# Patient Record
Sex: FEMALE | Race: WHITE | Employment: OTHER | URBAN - METROPOLITAN AREA
[De-identification: names, ages, dates, MRNs, and addresses within clinical notes are randomized per-mention and may not be internally consistent; named-entity substitution may affect disease eponyms.]

---

## 2017-01-04 ENCOUNTER — OFFICE VISIT (OUTPATIENT)
Dept: VASCULAR SURGERY | Age: 77
End: 2017-01-04

## 2017-01-04 ENCOUNTER — HOSPITAL ENCOUNTER (OUTPATIENT)
Dept: LAB | Age: 77
Discharge: HOME | End: 2017-01-04
Payer: MEDICARE

## 2017-01-04 VITALS
RESPIRATION RATE: 20 BRPM | HEART RATE: 72 BPM | WEIGHT: 128 LBS | BODY MASS INDEX: 24.17 KG/M2 | SYSTOLIC BLOOD PRESSURE: 122 MMHG | HEIGHT: 61 IN | DIASTOLIC BLOOD PRESSURE: 60 MMHG

## 2017-01-04 DIAGNOSIS — I73.9 PERIPHERAL ARTERIAL DISEASE (HCC): ICD-10-CM

## 2017-01-04 DIAGNOSIS — I77.811 AORTIC ECTASIA, ABDOMINAL (HCC): Primary | ICD-10-CM

## 2017-01-04 LAB
ALBUMIN SERPL BCP-MCNC: 3.3 G/DL (ref 3.4–5)
ALBUMIN/GLOB SERPL: 1 {RATIO} (ref 0.8–1.7)
ALP SERPL-CCNC: 78 U/L (ref 45–117)
ALT SERPL-CCNC: 18 U/L (ref 13–56)
ANION GAP BLD CALC-SCNC: 9 MMOL/L (ref 3–18)
AST SERPL W P-5'-P-CCNC: 15 U/L (ref 15–37)
BILIRUB SERPL-MCNC: 0.2 MG/DL (ref 0.2–1)
BUN SERPL-MCNC: 10 MG/DL (ref 7–18)
BUN/CREAT SERPL: 10 (ref 12–20)
CALCIUM SERPL-MCNC: 8.7 MG/DL (ref 8.5–10.1)
CHLORIDE SERPL-SCNC: 109 MMOL/L (ref 100–108)
CO2 SERPL-SCNC: 26 MMOL/L (ref 21–32)
CREAT SERPL-MCNC: 1.02 MG/DL (ref 0.6–1.3)
GLOBULIN SER CALC-MCNC: 3.3 G/DL (ref 2–4)
GLUCOSE SERPL-MCNC: 102 MG/DL (ref 74–99)
POTASSIUM SERPL-SCNC: 3.8 MMOL/L (ref 3.5–5.5)
PROT SERPL-MCNC: 6.6 G/DL (ref 6.4–8.2)
SODIUM SERPL-SCNC: 144 MMOL/L (ref 136–145)

## 2017-01-04 PROCEDURE — 80053 COMPREHEN METABOLIC PANEL: CPT | Performed by: SURGERY

## 2017-01-04 PROCEDURE — 36415 COLL VENOUS BLD VENIPUNCTURE: CPT | Performed by: SURGERY
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
## 2017-01-11 ENCOUNTER — HOSPITAL ENCOUNTER (OUTPATIENT)
Dept: CT IMAGING | Age: 77
Discharge: HOME | End: 2017-01-11
Attending: SURGERY
Payer: MEDICARE

## 2017-01-11 DIAGNOSIS — I73.9 PERIPHERAL ARTERIAL DISEASE (HCC): ICD-10-CM

## 2017-01-11 DIAGNOSIS — I77.811 AORTIC ECTASIA, ABDOMINAL (HCC): ICD-10-CM

## 2017-01-11 PROCEDURE — 74011636320 HC RX REV CODE- 636/320: Performed by: SURGERY

## 2017-01-11 PROCEDURE — 75635 CT ANGIO ABDOMINAL ARTERIES: CPT

## 2017-01-11 RX ADMIN — IOPAMIDOL 125 ML: 755 INJECTION, SOLUTION INTRAVENOUS at 09:51

## 2017-01-18 ENCOUNTER — OFFICE VISIT (OUTPATIENT)
Dept: VASCULAR SURGERY | Age: 77
End: 2017-01-18

## 2017-01-18 VITALS
HEART RATE: 74 BPM | RESPIRATION RATE: 20 BRPM | WEIGHT: 128 LBS | BODY MASS INDEX: 24.17 KG/M2 | HEIGHT: 61 IN | DIASTOLIC BLOOD PRESSURE: 80 MMHG | SYSTOLIC BLOOD PRESSURE: 120 MMHG

## 2017-01-18 DIAGNOSIS — M79.604 PAIN OF RIGHT LOWER EXTREMITY: ICD-10-CM

## 2017-01-18 DIAGNOSIS — I77.811 AORTIC ECTASIA, ABDOMINAL (HCC): Primary | ICD-10-CM

## 2017-01-18 DIAGNOSIS — I73.9 PERIPHERAL ARTERIAL DISEASE (HCC): ICD-10-CM
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
## 2017-02-21 ENCOUNTER — OFFICE VISIT (OUTPATIENT)
Dept: VASCULAR SURGERY | Age: 77
End: 2017-02-21

## 2017-02-21 VITALS
HEART RATE: 76 BPM | BODY MASS INDEX: 24.17 KG/M2 | HEIGHT: 61 IN | SYSTOLIC BLOOD PRESSURE: 122 MMHG | DIASTOLIC BLOOD PRESSURE: 68 MMHG | WEIGHT: 128 LBS | RESPIRATION RATE: 20 BRPM

## 2017-02-21 DIAGNOSIS — I77.811 AORTIC ECTASIA, ABDOMINAL (HCC): Primary | ICD-10-CM

## 2017-02-21 DIAGNOSIS — M79.604 PAIN OF RIGHT LOWER EXTREMITY: ICD-10-CM

## 2017-02-21 DIAGNOSIS — I73.9 PERIPHERAL ARTERIAL DISEASE (HCC): ICD-10-CM
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
## 2017-02-28 ENCOUNTER — HOSPITAL ENCOUNTER (OUTPATIENT)
Dept: VASCULAR SURGERY | Age: 77
Discharge: HOME | End: 2017-02-28
Attending: SURGERY
Payer: MEDICARE

## 2017-02-28 DIAGNOSIS — I73.9 PERIPHERAL ARTERIAL DISEASE (HCC): ICD-10-CM

## 2017-02-28 PROCEDURE — 93923 UPR/LXTR ART STDY 3+ LVLS: CPT

## 2017-02-28 PROCEDURE — 93926 LOWER EXTREMITY STUDY: CPT

## 2017-02-28 NOTE — PROCEDURES
[facility]  *** FINAL REPORT ***    Name: Karen Friday  MRN: [id number]    Outpatient  : 29 Oct 1940  HIS Order #: [id number]  [address] [facility] Visit #: [id number]  Date: 2017    TYPE OF TEST: Peripheral Arterial Testing    REASON FOR TEST  Claudication, Surveillance    Right Leg  Doppler:    Normal  Ankle/Brachial: 0.85    Left Leg  Doppler:    Abnormal  Ankle/Brachial: 0.57    INTERPRETATION/FINDINGS  Physiologic testing was performed using continuous wave Doppler and  segmental pressures. 1. Mild peripheral arterial disease indicated at rest in the right  leg. The right ankle brachial index is 0.85 and the toe/brachial index   is 0.46.    2. Moderate peripheral arterial disease indicated at rest in the left  leg. The left ankle. brachial index is 0.57 and th etoe/brachial index  is 0.46    ADDITIONAL COMMENTS  There has been a significant drop in the ankle/brachial index in the  right leg since the last exam, from 1.01 to 0.85. I have personally reviewed the data relevant to the interpretation of  this  study. TECHNOLOGIST: Heber Guerrero  Signed: 2017 11:40 AM    PHYSICIAN: Tutu Nolen.  Sadie Che MD  Signed: 2017 03:43 PM

## 2017-03-01 ENCOUNTER — HOSPITAL ENCOUNTER (OUTPATIENT)
Dept: PHYSICAL THERAPY | Age: 77
Discharge: HOME | End: 2017-03-01
Payer: MEDICARE

## 2017-03-01 PROCEDURE — G8979 MOBILITY GOAL STATUS: HCPCS

## 2017-03-01 PROCEDURE — 97162 PT EVAL MOD COMPLEX 30 MIN: CPT

## 2017-03-01 PROCEDURE — 97110 THERAPEUTIC EXERCISES: CPT

## 2017-03-01 PROCEDURE — G8978 MOBILITY CURRENT STATUS: HCPCS
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
## 2017-03-01 NOTE — PROGRESS NOTES
PT DAILY TREATMENT NOTE/HIP EVAL3-16    Patient Name: Nithya Mcclure  Date:3/1/2017  : 1940  [x]  Patient  Verified  Payor: Guzman Signs / Plan: [place] PART [place] & B / Product Type: Medicare /    In time: 11:03  Out time:11:55  Total Treatment Time (min): 52  Total Timed Codes (min): 15  1:1 Treatment Time ( [address] only): 52   Visit #: 1 of 8    Treatment Area: Chronic groin pain, right [R10.31, G89.29]    SUBJECTIVE  Pain Level (0-10 scale):  6/10- Back. No groin pain right now. []constant []intermittent []improving []worsening []no change since onset    Any medication changes, allergies to medications, adverse drug reactions, diagnosis change, or new procedure performed?: [x] No    [] Yes (see summary sheet for update)  Subjective functional status/changes:     PLOF:  Patient was able to walk without any discomfort in groin   Limitations to PLOF: pain   Mechanism of Injury: Patient presents with (R) groin pain that began since patient had a (B) leg bypass in 2016. Patient had tests yesterday to rule out any obstruction in the legs. No post-op complications. Patient stated that groin pain is random, but doesn't necessarily happen every day. Current symptoms/Complaints: Patient stated that occasionally when she walking she will get a sharp pain in her (R) groin that casues her knee to give out. Patient has not had any falls from the groin pain, and has been able to catch herself. The falls in Sept and dec 2016 that patient put in paper work was from a UTI. No sustaining injuring, just had bruised (L) leg. No pain at rest. No Numbness/tingling  Previous Treatment/Compliance: Prior PT a long time ago and isn't sure what she came to PT for. No other treatment for groin. No X-rays No MRI. PMHx/Surgical Hx: No previous back/hip/knee/ankle foot surg. No pacemaker No cardiac arrythmias. Heart stent: . (B) Leg Bypass in  and . Multiple TIAs- most recent 2 years ago.  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 COPD- sleeps with O2 at night. Work Hx: not currently working  Living Situation: 2 story house, but lives downstairs. No problems with stairs. Pt Goals: see in chart  Barriers: []pain []financial []time []transportation []other None  Motivation: moderately   Substance use: []Alcohol []Tobacco []other: none  FABQ Score: []low []elevate  Cognition: A & O x  3   Other:    OBJECTIVE/EXAMINATION  Domestic Life: lives with son   Activity/Recreational Limitations: pain   Mobility: limited during onset of pain   Self Care: Ind     37 min [x]Eval                  []Re-Eval       15 min Therapeutic Exercise:  [x] See flow sheet : HEP   Rationale: increase ROM and increase strength to improve the patients ability to perform ADLs          With   [] TE   [] TA   [] neuro   [] other: Patient Education: [x] Review HEP    [] Progressed/Changed HEP based on:   [] positioning   [] body mechanics   [] transfers   [] heat/ice application    [] other:      Other Objective/Functional Measures: See Below    Physical Therapy Evaluation- Hip    Posture:    Gait:  [x] Normal    [] Abnormal    [] Antalgic    [] NWB    Device: no AD    Describe:  NO onset of hip pain or discomfort.           ROM/Strength        AROM                     PROM        Strength (1-5)  Hip Left Right Left Right Left Right   Flexion     3+ 3+   Extension     3+ 3+   Abduction     4- 3+   Adduction         ER 55 40       IR 30 40       Knee Left Right Left Right Left Right   Extension     4- 4-   Flexion     4 4        Flexibility: [] Unable to assess at this time  Hamstrings:    (L) Tightness= [] WNL   [x] Min   [] Mod   [] Severe    (R) Tightness= [] WNL   [x] Min   [] Mod   [] Severe  Quadriceps:    (L) Tightness= [] WNL   [x] Min   [] Mod   [] Severe    (R) Tightness= [] WNL   [x] Min   [] Mod   [] Severe  Gastroc: No gastroc or Leg pain reported with palpation    (L) Tightness= [] WNL   [] Min   [] Mod   [] Severe    (R) Tightness= [] WNL [] Min   [] Mod   [] Severe                                  Palpation  [] Min  [] Mod  [] Severe    Location: No TTP of hip flexor/quad   [] Min  [] Mod  [] Severe    Location:  [] Min  [] Mod  [] Severe    Location:    Optional Tests  Other tests/ comments:   No onset of pain in (R) groin with any position, exercise, or activity done during the evaluation. Pain Level (0-10 scale) post treatment: 0/10     ASSESSMENT/Changes in Function: See POC. Patient reported no onset of (R) groin pain throughout evaluation and had no pain at end of session. Advised patient to perform HEP gently and to stop if pain increases. Patient will continue to benefit from skilled PT services to modify and progress therapeutic interventions, address functional mobility deficits, address ROM deficits, address strength deficits, analyze and address soft tissue restrictions, analyze and cue movement patterns, assess and modify postural abnormalities and address imbalance/dizziness to attain remaining goals.      [x]  See Plan of Care  []  See progress note/recertification  []  See Discharge Summary         Progress towards goals / Updated goals:  See POC    PLAN  []  Upgrade activities as tolerated     [x]  Continue plan of care  []  Update interventions per flow sheet       []  Discharge due to:_  []  Other:_      Sayda Mina, PT 3/1/2017  11:09 AM

## 2017-03-03 ENCOUNTER — HOSPITAL ENCOUNTER (OUTPATIENT)
Dept: PHYSICAL THERAPY | Age: 77
Discharge: HOME | End: 2017-03-03
Payer: MEDICARE

## 2017-03-03 PROCEDURE — 97110 THERAPEUTIC EXERCISES: CPT

## 2017-03-03 NOTE — PROGRESS NOTES
PT DAILY TREATMENT NOTE - [facility] 3-16    Patient Name: Ton Williamson  Date:3/3/2017  : 1940  [x]  Patient  Verified  Payor: Roxann Mendez / Plan: [place] MED[place] & B / Product Type: Medicare /    In time: 11:27  Out time: 12:03  Total Treatment Time (min): 36  Total Timed Codes (min): 36  1:1 Treatment Time ([facility] only): 36   Visit #: 2 of 8    Treatment Area: Chronic groin pain, right [R10.31, G89.29]    SUBJECTIVE  Pain Level (0-10 scale): 0/10  Any medication changes, allergies to medications, adverse drug reactions, diagnosis change, or new procedure performed?: [x] No    [] Yes (see summary sheet for update)  Subjective functional status/changes:   [] No changes reported  Patient reported no pain today and hasn't had any sharp groin pain since the eval. Patient stated she did the HEP 1x and didn't have any questions or concerns. OBJECTIVE    36 min Therapeutic Exercise:  [x] See flow sheet :   Rationale: increase ROM and increase strength to improve the patients ability to perform ADLs       With   [] TE   [] TA   [] neuro   [] other: Patient Education: [x] Review HEP    [] Progressed/Changed HEP based on:   [] positioning   [] body mechanics   [] transfers   [] heat/ice application    [] other:      Other Objective/Functional Measures:   Initiated therapeutic exercises per flow sheet. Modified standing hip flexor stretch to supine due to patient report that she didn't feel any stretch while standing. Negative ANTONY test   Pain Level (0-10 scale) post treatment: 0/10     ASSESSMENT/Changes in Function: Patient reported no onset of (R) groin pain and no increase in low back pain during the exercises. Patient demonstrated decreased (R) glut med strength/endurance with side lying therapeutic exercises.      Patient will continue to benefit from skilled PT services to modify and progress therapeutic interventions, address functional mobility deficits, address ROM deficits, address strength deficits, analyze and address soft tissue restrictions, analyze and cue movement patterns, assess and modify postural abnormalities and address imbalance/dizziness to attain remaining goals. []  See Plan of Care  []  See progress note/recertification  []  See Discharge Summary         Progress towards goals / Updated goals:  Short Term Goals: To be accomplished in 1-2 weeks:  1. Patient will be ind and compliant with HEP 1-2x/day to increase ease with ADLs. Status at Eval: HEP established  Long Term Goals: To be accomplished in 3-4 weeks:  1. Patient will improve FOTO by at least 5 points to assist with return to PLOF. Status at Eval: 57  2. Patient will improve (R) hip strength to 4-/5 to increase ease with transfers  Status at Eval: Hip (R) flex: 3+/5 Abd: 3+/5, Ext: 3+/5  3. Patient will report not having any onset of sharp (R) groin pain for 1 week to increase safety with household management.    Status at Eval: Patient reports random onset of sharp (R) groin pain    PLAN  []  Upgrade activities as tolerated     [x]  Continue plan of care  []  Update interventions per flow sheet       []  Discharge due to:_  []  Other:_      Nichole Cornelius, PT 3/3/2017  11:35 AM

## 2017-03-06 ENCOUNTER — HOSPITAL ENCOUNTER (OUTPATIENT)
Dept: PHYSICAL THERAPY | Age: 77
Discharge: HOME | End: 2017-03-06
Payer: MEDICARE

## 2017-03-06 PROCEDURE — 97110 THERAPEUTIC EXERCISES: CPT

## 2017-03-06 NOTE — PROGRESS NOTES
PT DAILY TREATMENT NOTE - [facility]     Patient Name: Cesia Kim  Date:3/6/2017  : 1940  [x]  Patient  Verified  Payor: Hyacinth Figueroa / Plan: [place] MEDICARE PART A & B / Product Type: Medicare /    In time:1030  Out time:1115  Total Treatment Time (min): 45  Total Timed Codes (min): 45  1:1 Treatment Time ([address] only): 45   Visit #: 3 of 8    Treatment Area: Chronic groin pain, right [R10.31, G89.29]    SUBJECTIVE  Pain Level (0-10 scale): 0  Any medication changes, allergies to medications, adverse drug reactions, diagnosis change, or new procedure performed?: [x] No    [] Yes (see summary sheet for update)  Subjective functional status/changes:   [] No changes reported  Intermittent pain through out the day. OBJECTIVE        45 min Therapeutic Exercise:  [] See flow sheet :   Rationale: increase ROM, increase strength and improve coordination          With   [] TE   [] TA   [] neuro   [] other: Patient Education: [x] Review HEP    [] Progressed/Changed HEP based on:   [] positioning   [] body mechanics   [] transfers   [] heat/ice application    [] other:      Other Objective/Functional Measures:   none     Pain Level (0-10 scale) post treatment: 0    ASSESSMENT/Changes in Function:   Good tolerance to therapy with no adverse effects. No onset of groin pain with TE    Patient will continue to benefit from skilled PT services to modify and progress therapeutic interventions, address functional mobility deficits, address ROM deficits and address strength deficits to attain remaining goals. [x]  See Plan of Care  []  See progress note/recertification  []  See Discharge Summary         Progress towards goals / Updated goals:  Short Term Goals: To be accomplished in 1-2 weeks:  1. Patient will be ind and compliant with HEP 1-2x/day to increase ease with ADLs. Status at Eval: HEP established  Long Term Goals: To be accomplished in 3-4 weeks:  1.  Patient will improve FOTO by at least 5 points to assist with return to [facility]. Status at Eval: 57  2. Patient will improve (R) hip strength to 4-/5 to increase ease with transfers  Status at Eval: Hip (R) flex: 3+/5 Abd: 3+/5, Ext: 3+/5  3. Patient will report not having any onset of sharp (R) groin pain for 1 week to increase safety with household management.    Status at Eval: Patient reports random onset of sharp (R) groin pain    PLAN  [x]  Upgrade activities as tolerated     [x]  Continue plan of care  []  Update interventions per flow sheet       []  Discharge due to:_  []  Other:_      Santosh Watson PTA 3/6/2017  10:50 AM    Future Appointments  Date Time Provider Ho Sanchez   3/9/2017 11:00 AM GABINO Alvarado [facility]   3/14/2017 10:30 AM ANA MARÍA Stevenson THE [facility]   3/16/2017 10:30 AM ANA MARÍA Stevenson THE [facility]   3/21/2017 10:15 AM Mo Lerner MD [id number] [facility]

## 2017-03-09 ENCOUNTER — HOSPITAL ENCOUNTER (OUTPATIENT)
Dept: PHYSICAL THERAPY | Age: 77
Discharge: HOME | End: 2017-03-09
Payer: MEDICARE

## 2017-03-09 PROCEDURE — 97110 THERAPEUTIC EXERCISES: CPT

## 2017-03-09 NOTE — PROGRESS NOTES
PT DAILY TREATMENT NOTE - [facility]     Patient Name: Izzy Poole  Date:3/9/2017  : 1940  [x]  Patient  Verified  Payor: Karen Pina / Plan: [place] MEDICARE PART A & B / Product Type: Medicare /    In time:11  Out time:12  Total Treatment Time (min): 60  Total Timed Codes (min): 60  1:1 Treatment Time ( [address] only): 40   Visit #: 4 of 8    Treatment Area: Chronic groin pain, right [R10.31, G89.29]    SUBJECTIVE  Pain Level (0-10 scale): 0  Any medication changes, allergies to medications, adverse drug reactions, diagnosis change, or new procedure performed?: [x] No    [] Yes (see summary sheet for update)  Subjective functional status/changes:   [] No changes reported  No onset of groin pan since last session.  Min soreness     OBJECTIVE    Modality rationale: increase tissue extensibility   Min Type Additional Details    [] Estim:  []Unatt       []IFC  []Premod                        []Other:  []w/ice   []w/heat  Position:  Location:    [] Estim: []Att    []TENS instruct  []NMES                    []Other:  []w/US   []w/ice   []w/heat  Position:  Location:    []  Traction: [] Cervical       []Lumbar                       [] Prone          []Supine                       []Intermittent   []Continuous Lbs:  [] before manual  [] after manual    []  Ultrasound: []Continuous   [] Pulsed                           []1MHz   []3MHz W/cm2:  Location:    []  Iontophoresis with dexamethasone         Location: [] Take home patch   [] In clinic   10 []  Ice     [x]  heat  []  Ice massage  []  Laser   []  Anodyne Position:supine  Location:R groin    []  Laser with stim  []  Other:  Position:  Location:    []  Vasopneumatic Device Pressure:       [] lo [] med [] hi   Temperature: [] lo [] med [] hi   [] Skin assessment post-treatment:  []intact []redness- no adverse reaction    []redness  adverse reaction:       50 min Therapeutic Exercise:  [] See flow sheet :   Rationale: increase ROM, increase strength and improve coordination          With   [] TE   [] TA   [] neuro   [] other: Patient Education: [x] Review HEP    [] Progressed/Changed HEP based on:   [] positioning   [] body mechanics   [] transfers   [] heat/ice application    [] other:      Other Objective/Functional Measures:   Fatigue using recumbent bike  No pain with TE     Pain Level (0-10 scale) post treatment: 0    ASSESSMENT/Changes in Function:   Chief c/o soreness no groin pain    Patient will continue to benefit from skilled PT services to modify and progress therapeutic interventions, address functional mobility deficits, address ROM deficits, address strength deficits and analyze and address soft tissue restrictions to attain remaining goals. []  See Plan of Care  []  See progress note/recertification  []  See Discharge Summary         Progress towards goals / Updated goals:  Short Term Goals: To be accomplished in 1-2 weeks:  1. Patient will be ind and compliant with HEP 1-2x/day to increase ease with ADLs. Status at Eval: HEP established  Current:     Long Term Goals: To be accomplished in 3-4 weeks:  1. Patient will improve FOTO by at least 5 points to assist with return to PLOF. Status at Eval: 57  Current: NT    2. Patient will improve (R) hip strength to 4-/5 to increase ease with transfers  Status at Eval: Hip (R) flex: 3+/5 Abd: 3+/5, Ext: 3+/5  Current: NT    3. Patient will report not having any onset of sharp (R) groin pain for 1 week to increase safety with household management.    Status at Eval: Patient reports random onset of sharp (R) groin   Current: no onset past couple days    PLAN  [x]  Upgrade activities as tolerated     [x]  Continue plan of care  []  Update interventions per flow sheet       []  Discharge due to:_  []  Other:_      Antoinette Fleming PTA 3/9/2017  11:31 AM    Future Appointments  Date Time Provider Ho Sanchez   3/14/2017 10:30 AM GABINO Negrete THE [facility]   3/16/2017 10:30 AM GABINO Negrete THE [facility] 3/21/2017 10:15 AM Jessica Castelan MD [id number] [facility]

## 2017-03-14 ENCOUNTER — HOSPITAL ENCOUNTER (OUTPATIENT)
Dept: PHYSICAL THERAPY | Age: 77
Discharge: HOME | End: 2017-03-14
Payer: MEDICARE

## 2017-03-14 PROCEDURE — 97110 THERAPEUTIC EXERCISES: CPT

## 2017-03-14 NOTE — PROGRESS NOTES
PT DAILY TREATMENT NOTE - [facility]     Patient Name: Ton Williamson  Date:3/14/2017  : 1940  [x]  Patient  Verified  Payor: Roxann Mendez / Plan: [place] MEDICARE PART A & B / Product Type: Medicare /    In time:1030  Out time:1110  Total Treatment Time (min): 40  Total Timed Codes (min): 40  1:1 Treatment Time ([facility] only): 30   Visit #: 5 of 8    Treatment Area: Chronic groin pain, right [R10.31, G89.29]    SUBJECTIVE  Pain Level (0-10 scale): 0  Any medication changes, allergies to medications, adverse drug reactions, diagnosis change, or new procedure performed?: [x] No    [] Yes (see summary sheet for update)  Subjective functional status/changes:   [x] No changes reported  No recent onset of groin pain    OBJECTIVE      40 min Therapeutic Exercise:  [] See flow sheet :   Rationale: increase ROM, increase strength and improve coordination             With   [] TE   [] TA   [] neuro   [] other: Patient Education: [x] Review HEP    [] Progressed/Changed HEP based on:   [] positioning   [] body mechanics   [] transfers   [] heat/ice application    [] other:      Other Objective/Functional Measures:   none     Pain Level (0-10 scale) post treatment: 0    ASSESSMENT/Changes in Function:   Improved control and coordination with TE, No onset of groin pain. Reports of muscle soreness and fatigue. Patient will continue to benefit from skilled PT services to modify and progress therapeutic interventions, address functional mobility deficits, address ROM deficits and address strength deficits to attain remaining goals. [x]  See Plan of Care  []  See progress note/recertification  []  See Discharge Summary         Progress towards goals / Updated goals:  Short Term Goals: To be accomplished in 1-2 weeks:  1. Patient will be ind and compliant with HEP 1-2x/day to increase ease with ADLs. Status at Eval: HEP established  Current: compliant     Long Term Goals: To be accomplished in 3-4 weeks:  1.  Patient will improve FOTO by at least 5 points to assist with return to PLOF. Status at Eval: 62  Current: foto down     2. Patient will improve (R) hip strength to 4-/5 to increase ease with transfers  Status at Eval: Hip (R) flex: 3+/5 Abd: 3+/5, Ext: 3+/5  Current: NT     3. Patient will report not having any onset of sharp (R) groin pain for 1 week to increase safety with household management.    Status at Eval: Patient reports random onset of sharp (R) groin   Current: no onset past week       PLAN  [x]  Upgrade activities as tolerated     [x]  Continue plan of care  []  Update interventions per flow sheet       []  Discharge due to:_  []  Other:_      Rocio Nagel PTA 3/14/2017  10:58 AM    Future Appointments  Date Time Provider Ho Sanchez   3/16/2017 10:30 AM Rocio Nagel PTA [facility]   3/21/2017 10:15 AM Kacey Rivera MD [id number] [facility]

## 2017-03-16 ENCOUNTER — APPOINTMENT (OUTPATIENT)
Dept: PHYSICAL THERAPY | Age: 77
End: 2017-03-16
Payer: MEDICARE

## 2017-03-21 ENCOUNTER — OFFICE VISIT (OUTPATIENT)
Dept: VASCULAR SURGERY | Age: 77
End: 2017-03-21

## 2017-03-21 VITALS
HEIGHT: 61 IN | DIASTOLIC BLOOD PRESSURE: 76 MMHG | HEART RATE: 74 BPM | BODY MASS INDEX: 24.17 KG/M2 | SYSTOLIC BLOOD PRESSURE: 132 MMHG | RESPIRATION RATE: 18 BRPM | WEIGHT: 128 LBS

## 2017-03-21 DIAGNOSIS — M79.604 PAIN OF RIGHT LOWER EXTREMITY: ICD-10-CM

## 2017-03-21 DIAGNOSIS — I73.9 PERIPHERAL ARTERIAL DISEASE (HCC): Primary | ICD-10-CM

## 2017-03-21 RX ORDER — TOLTERODINE 4 MG/1
4 CAPSULE, EXTENDED RELEASE ORAL DAILY
COMMUNITY
End: 2018-07-31

## 2017-03-21 RX ORDER — INDAPAMIDE 2.5 MG/1
2.5 TABLET, FILM COATED ORAL DAILY
COMMUNITY

## 2017-03-21 RX ORDER — ESCITALOPRAM OXALATE 10 MG/1
10 TABLET ORAL DAILY
COMMUNITY
End: 2018-07-31

## 2017-03-21 NOTE — MR AVS SNAPSHOT
Visit Information Date & Time Provider Department Dept. Phone Encounter #  
 3/21/2017 10:15 AM Radhames Morgan MD [facility] [address] [id number] Your Appointments 6/21/2017  9:30 AM  
Follow Up with Radhames Morgan MD  
[facility] [address] [facility] [address] [facility]  
  
   
 [facility] Upcoming Health Maintenance Date Due DTaP/Tdap/Td series (1 - Tdap) 10/29/1963 FOBT Q 1 YEAR AGE 50-75 10/29/1992 ZOSTER VACCINE AGE 60> 10/29/2002 GLAUCOMA SCREENING Q2Y 10/29/2007 MEDICARE YEARLY EXAM 10/29/2007 Pneumococcal 65+ Low/Medium Risk (2 of 2 - PCV13) 4/16/2015 INFLUENZA AGE 9 TO ADULT 8/1/2016 Allergies as of 3/21/2017  Review Complete On: 3/1/2017 By: Erwin Morgan, PT Severity Noted Reaction Type Reactions Oxybutynin Chloride Medium 05/13/2016   Systemic Swelling, Contact Dermatitis Minocycline  09/10/2012    Itching Nsaids (Non-steroidal Anti-inflammatory Drug)  09/10/2012    Other (comments) Mess stomach up Oragrafin  09/10/2012    Rash, Swelling Other Medication  03/01/2017    Other (comments) \"chloride tabs\"- \"critical\" Sulfa (Sulfonamide Antibiotics)  09/10/2012    Hives, Swelling Current Immunizations  Reviewed on 4/17/2014 Name Date Influenza Vaccine 10/1/2013 Pneumococcal Polysaccharide (PPSV-23) 4/16/2014  6:45 AM,  Deferred (Patient Refused) Not reviewed this visit Vitals BP Pulse Resp Height(growth percentile) Weight(growth percentile) BMI  
 132/76 74 18 5' 1\" (1.549 m) 128 lb (58.1 kg) 24.19 kg/m2 OB Status Smoking Status Hysterectomy Former Smoker Vitals History BMI and BSA Data Body Mass Index Body Surface Area  
 24.19 kg/m 2 1.58 m 2 Preferred Pharmacy Pharmacy Name Phone [id number] [address] [facility]. [phone] Your Updated Medication List  
  
   
This list is accurate as of: 3/21/17  1:55 PM.  Always use your most recent med list.  
  
  
  
  
 albuterol 90 mcg/actuation inhaler Commonly known as:  PROVENTIL HFA, VENTOLIN HFA, PROAIR HFA Take 1 Puff by inhalation every four (4) hours as needed. Indications: CHRONIC OBSTRUCTIVE PULMONARY DISEASE ALPHAGAN P 0.1 % ophthalmic solution Generic drug:  brimonidine Administer  to both eyes two (2) times a day. Indications: glaucoma  
  
 aspirin 81 mg chewable tablet Take 1 Tab by mouth daily. BROVANA 15 mcg/2 mL Nebu neb solution Generic drug:  arformoterol 15 mcg by Nebulization route two (2) times a day. Pt instructed to take am of surgery. Indications: COPD ASSOCIATED WITH CHRONIC BRONCHITIS buPROPion 100 mg tablet Commonly known as:  [facility] Take 200 mg by mouth two (2) times a day. Indications: MAJOR DEPRESSIVE DISORDER  
  
 calcium 500 mg Tab Take 2 Tabs by mouth daily. cholecalciferol (vitamin D3) 2,000 unit Tab Commonly known as:  VITAMIN D3 Take 5,000 mg by mouth daily. escitalopram oxalate 10 mg tablet Commonly known as:  Nubia Zeyad Take 10 mg by mouth daily. ferrous sulfate 325 mg (65 mg iron) tablet Take  by mouth Daily (before breakfast). Only takes 4 times per week. FML LIQUIFILM 0.1 % ophthalmic suspension Generic drug:  fluorometholone Administer 1 Drop to both eyes two (2) times a day. Indications: Dry eyes HYDROcodone-acetaminophen 5-325 mg per tablet Commonly known as:  Wayna Glaze Take 1 Tab by mouth every four (4) hours as needed for Pain. Max Daily Amount: 6 Tabs. indapamide 2.5 mg tablet Commonly known as:  Tee Starr Take  by mouth daily. levothyroxine 88 mcg tablet Commonly known as:  SYNTHROID  
 Take 88 mcg by mouth Daily (before breakfast). Pt instructed to take am of surgery. Indications: HYPOTHYROIDISM  
  
 magnesium chloride 64 mg tablet Commonly known as:  MAG DELAY Take 1 Tab by mouth daily. melatonin Tab tablet Take 10 mg by mouth nightly. MYRBETRIQ 50 mg ER tablet Generic drug:  mirabegron ER Take 50 mg by mouth daily. NEURONTIN 300 mg capsule Generic drug:  gabapentin Take 300 mg by mouth nightly. nicotinic acid 500 mg tablet Commonly known as:  NIACIN Take 1 Tab by mouth Daily (before breakfast). Omeprazole delayed release 20 mg tablet Commonly known as:  PRILOSEC D/R Take 20 mg by mouth daily. OTHER  
HOME OXYGEN  
  
 PLAVIX 75 mg Tab Generic drug:  clopidogrel Take 75 mg by mouth daily. Indications: Cardiac stent  
  
 pravastatin 40 mg tablet Commonly known as:  PRAVACHOL Take 80 mg by mouth nightly. Indications: HYPERCHOLESTEROLEMIA  
  
 ramipril 10 mg capsule Commonly known as:  ALTACE Take 10 mg by mouth daily. Pt instructed to take am of surgery. Indications: HYPERTENSION  
  
 RESTASIS 0.05 % ophthalmic emulsion Generic drug:  cycloSPORINE Administer 1 Drop to both eyes two (2) times a day. Indications: DRY EYE  
  
 tolterodine ER 4 mg ER capsule Commonly known as:  Dewight Alderman Take 4 mg by mouth daily. VALIUM 10 mg tablet Generic drug:  diazePAM  
Take 10 mg by mouth every eight (8) hours as needed. Indications: ANXIETY Introducing [facility]! Paulina Tolentino introduces [employer] patient portal. Now you can access parts of your medical record, email your doctor's office, and request medication refills online. 1. In your internet browser, go to [email or web address] 2. Click on the First Time User? Click Here link in the Sign In box. You will see the New Member Sign Up page. 3. Enter your [employer] Access Code exactly as it appears below.  You will not need to use this code after youve completed the sign-up process. If you do not sign up before the expiration date, you must request a new code. · [employer] Access Code: [email or web address] Expires: 6/8/2017  5:06 PM 
 
4. Enter the last four digits of your Social Security Number (xxxx) and Date of Birth (mm/dd/yyyy) as indicated and click Submit. You will be taken to the next sign-up page. 5. Create a [employer] ID. This will be your [employer] login ID and cannot be changed, so think of one that is secure and easy to remember. 6. Create a [employer] password. You can change your password at any time. 7. Enter your Password Reset Question and Answer. This can be used at a later time if you forget your password. 8. Enter your e-mail address. You will receive e-mail notification when new information is available in [address]. 9. Click Sign Up. You can now view and download portions of your medical record. 10. Click the Download Summary menu link to download a portable copy of your medical information. If you have questions, please visit the Frequently Asked Questions section of the [employer] website. Remember, [employer] is NOT to be used for urgent needs. For medical emergencies, dial 911. Now available from your iPhone and Android! Please provide this summary of care documentation to your next provider. Your primary care clinician is listed as Yaakov Mclain. If you have any questions after today's visit, please call [phone].
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
## 2017-06-05 ENCOUNTER — HOSPITAL ENCOUNTER (OUTPATIENT)
Dept: VASCULAR SURGERY | Age: 77
Discharge: HOME | End: 2017-06-05
Attending: SURGERY
Payer: MEDICARE

## 2017-06-05 DIAGNOSIS — I73.9 PERIPHERAL VASCULAR DISEASE, UNSPECIFIED (HCC): ICD-10-CM

## 2017-06-05 PROCEDURE — 93926 LOWER EXTREMITY STUDY: CPT

## 2017-06-05 NOTE — PROCEDURES
[facility]  *** FINAL REPORT ***    Name: Nikia Stiles  MRN: [id number]    Outpatient  : 29 Oct 1940  HIS Order #: [id number]  [address] [facility] Visit #: [id number]  Date: 2017    TYPE OF TEST: Bypass Graft Duplex    REASON FOR TEST  Claudication    Graft:-  Summary:   Revision of Right common femoral - popliteal (below knee)  bypass  Op. Date:  2016  Surgeon: Keke Stiles    Results:-            Velocity  Ratio  Stenosis         Waveform            --------  -----  --------         ----------  Inflow:    235.0  Proximal:  186.0      0.8  Normal  Upper:      95.0      0.5  Normal  Mid-graft:  86.0      0.9  Normal  Lower:      60.0      0.7  Normal  Distal:     52.0      0.9  Normal  Outflow:    56.0      1.1  Minimal    ALEX:    INTERPRETATION/FINDINGS  Duplex images were obtained using 2-D gray scale, color flow, and  spectral Doppler analysis. Duplex exam of the right fem pop bypass graft reveals :  No evidence of significant stenosis throughout the graft length. Right posterior tibial artery shows significant stenosis in the mid  portion the velocity goes from 31 cm/s to 154cm/s. Vessels in not seen   well distal to the stenotic area. The distally posterior tibial  artery collateralized flow from the Peroneal artery. Peroneal and anterior tibial arteries are patent with biphasic signal.    ADDITIONAL COMMENTS  No significant change from the previous exam,    I have personally reviewed the data relevant to the interpretation of  this  study.     TECHNOLOGIST: Lizzy Macedo  Signed: 2017 04:17 PM    PHYSICIAN: Juan Carmichael MD  Signed: 2017 02:49 PM

## 2017-06-23 ENCOUNTER — OFFICE VISIT (OUTPATIENT)
Dept: VASCULAR SURGERY | Age: 77
End: 2017-06-23

## 2017-06-23 VITALS
RESPIRATION RATE: 20 BRPM | WEIGHT: 128 LBS | BODY MASS INDEX: 24.17 KG/M2 | DIASTOLIC BLOOD PRESSURE: 68 MMHG | HEIGHT: 61 IN | HEART RATE: 76 BPM | SYSTOLIC BLOOD PRESSURE: 122 MMHG

## 2017-06-23 DIAGNOSIS — R10.32 BILATERAL GROIN PAIN: ICD-10-CM

## 2017-06-23 DIAGNOSIS — R10.31 BILATERAL GROIN PAIN: ICD-10-CM

## 2017-06-23 DIAGNOSIS — I73.9 PERIPHERAL VASCULAR DISEASE, UNSPECIFIED (HCC): Primary | ICD-10-CM
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
## 2017-06-23 NOTE — MR AVS SNAPSHOT
Visit Information Date & Time Provider Department Dept. Phone Encounter #  
 6/23/2017  9:30 AM Selvin Mills MD [facility] [address] [id number] Follow-up Instructions Return in about 3 months (around 9/23/2017). Your Appointments 9/26/2017 10:15 AM  
Office Visit with Selvin Mills MD  
[facility] [place] [facility] Appt Note: 3 months fup without studies Formerly [facility] [address] [place] [address] Upcoming Health Maintenance Date Due DTaP/Tdap/Td series (1 - Tdap) 10/29/1963 FOBT Q 1 YEAR AGE 50-75 10/29/1992 ZOSTER VACCINE AGE 60> 10/29/2002 GLAUCOMA SCREENING Q2Y 10/29/2007 MEDICARE YEARLY EXAM 10/29/2007 Pneumococcal 65+ Low/Medium Risk (2 of 2 - PCV13) 4/16/2015 INFLUENZA AGE 9 TO ADULT 8/1/2017 Allergies as of 6/23/2017  Review Complete On: 6/23/2017 By: Selvin Mills MD  
  
 Severity Noted Reaction Type Reactions Oxybutynin Chloride Medium 05/13/2016   Systemic Swelling, Contact Dermatitis Minocycline  09/10/2012    Itching Nsaids (Non-steroidal Anti-inflammatory Drug)  09/10/2012    Other (comments) Mess stomach up Oragrafin  09/10/2012    Rash, Swelling Other Medication  03/01/2017    Other (comments) \"chloride tabs\"- \"critical\" Sulfa (Sulfonamide Antibiotics)  09/10/2012    Hives, Swelling Current Immunizations  Reviewed on 4/17/2014 Name Date Influenza Vaccine 10/1/2013 Pneumococcal Polysaccharide (PPSV-23) 4/16/2014  6:45 AM,  Deferred (Patient Refused) Not reviewed this visit You Were Diagnosed With   
  
 Codes Comments Peripheral vascular disease, unspecified ([facility]    -  Primary ICD-10-CM: I73.9 ICD-9-CM: 443.9 Bilateral groin pain     ICD-10-CM: R10.30 ICD-9-CM: 789.09 Vitals BP Pulse Resp Height(growth percentile) Weight(growth percentile) BMI  
 122/68 76 20 5' 1\" (1.549 m) 128 lb (58.1 kg) 24.19 kg/m2 OB Status Smoking Status Hysterectomy Former Smoker Vitals History BMI and BSA Data Body Mass Index Body Surface Area  
 24.19 kg/m 2 1.58 m 2 Preferred Pharmacy Pharmacy Name Phone [id number] [address] [place] [address]. [phone] Your Updated Medication List  
  
   
This list is accurate as of: 6/23/17 10:21 AM.  Always use your most recent med list.  
  
  
  
  
 albuterol 90 mcg/actuation inhaler Commonly known as:  PROVENTIL HFA, VENTOLIN HFA, PROAIR HFA Take 1 Puff by inhalation every four (4) hours as needed. Indications: CHRONIC OBSTRUCTIVE PULMONARY DISEASE ALPHAGAN P 0.1 % ophthalmic solution Generic drug:  brimonidine Administer  to both eyes two (2) times a day. Indications: glaucoma  
  
 aspirin 81 mg chewable tablet Take 1 Tab by mouth daily. BROVANA 15 mcg/2 mL Nebu neb solution Generic drug:  arformoterol 15 mcg by Nebulization route two (2) times a day. Pt instructed to take am of surgery. Indications: COPD ASSOCIATED WITH CHRONIC BRONCHITIS buPROPion 100 mg tablet Commonly known as:  [facility] Take 200 mg by mouth two (2) times a day. Indications: MAJOR DEPRESSIVE DISORDER  
  
 calcium 500 mg Tab Take 2 Tabs by mouth daily. cholecalciferol (vitamin D3) 2,000 unit Tab Commonly known as:  VITAMIN D3 Take 5,000 mg by mouth daily. escitalopram oxalate 10 mg tablet Commonly known as:  Shan Lesia Take 10 mg by mouth daily. ferrous sulfate 325 mg (65 mg iron) tablet Take  by mouth Daily (before breakfast). Only takes 4 times per week. FML LIQUIFILM 0.1 % ophthalmic suspension Generic drug:  fluorometholone Administer 1 Drop to both eyes two (2) times a day. Indications: Dry eyes HYDROcodone-acetaminophen 5-325 mg per tablet Commonly known as:  [address] Take 1 Tab by mouth every four (4) hours as needed for Pain. Max Daily Amount: 6 Tabs. indapamide 2.5 mg tablet Commonly known as:  Philemon Hock Take  by mouth daily. levothyroxine 88 mcg tablet Commonly known as:  SYNTHROID Take 88 mcg by mouth Daily (before breakfast). Pt instructed to take am of surgery. Indications: HYPOTHYROIDISM  
  
 magnesium chloride 64 mg tablet Commonly known as:  MAG DELAY Take 1 Tab by mouth daily. melatonin Tab tablet Take 10 mg by mouth nightly. MYRBETRIQ 50 mg ER tablet Generic drug:  mirabegron ER Take 50 mg by mouth daily. NEURONTIN 300 mg capsule Generic drug:  gabapentin Take 300 mg by mouth nightly. nicotinic acid 500 mg tablet Commonly known as:  NIACIN Take 1 Tab by mouth Daily (before breakfast). Omeprazole delayed release 20 mg tablet Commonly known as:  PRILOSEC D/R Take 20 mg by mouth daily. OTHER  
HOME OXYGEN  
  
 PLAVIX 75 mg Tab Generic drug:  clopidogrel Take 75 mg by mouth daily. Indications: Cardiac stent  
  
 pravastatin 40 mg tablet Commonly known as:  PRAVACHOL Take 80 mg by mouth nightly. Indications: HYPERCHOLESTEROLEMIA  
  
 ramipril 10 mg capsule Commonly known as:  ALTACE Take 10 mg by mouth daily. Pt instructed to take am of surgery. Indications: HYPERTENSION  
  
 RESTASIS 0.05 % ophthalmic emulsion Generic drug:  cycloSPORINE Administer 1 Drop to both eyes two (2) times a day. Indications: DRY EYE  
  
 tolterodine ER 4 mg ER capsule Commonly known as:  Leon Mode Take 4 mg by mouth daily. VALIUM 10 mg tablet Generic drug:  diazePAM  
Take 10 mg by mouth every eight (8) hours as needed. Indications: ANXIETY Follow-up Instructions Return in about 3 months (around 9/23/2017). Introducing [facility]! [facility] introduces PicLyf patient portal. Now you can access parts of your medical record, email your doctor's office, and request medication refills online. 1. In your internet browser, go to [email or web address] 2. Click on the First Time User? Click Here link in the Sign In box. You will see the New Member Sign Up page. 3. Enter your PicLyf Access Code exactly as it appears below. You will not need to use this code after youve completed the sign-up process. If you do not sign up before the expiration date, you must request a new code. · PicLyf Access Code: [email or web address] Expires: 9/21/2017  9:35 AM 
 
4. Enter the last four digits of your Social Security Number (xxxx) and Date of Birth (mm/dd/yyyy) as indicated and click Submit. You will be taken to the next sign-up page. 5. Create a PicLyf ID. This will be your PicLyf login ID and cannot be changed, so think of one that is secure and easy to remember. 6. Create a PicLyf password. You can change your password at any time. 7. Enter your Password Reset Question and Answer. This can be used at a later time if you forget your password. 8. Enter your e-mail address. You will receive e-mail notification when new information is available in [address] 9. Click Sign Up. You can now view and download portions of your medical record. 10. Click the Download Summary menu link to download a portable copy of your medical information. If you have questions, please visit the Frequently Asked Questions section of the PicLyf website. Remember, PicLyf is NOT to be used for urgent needs. For medical emergencies, dial 911. Now available from your iPhone and Android! Please provide this summary of care documentation to your next provider. Your primary care clinician is listed as Neisha Smoker. If you have any questions after today's visit, please call [phone].

## 2017-08-08 ENCOUNTER — TELEPHONE (OUTPATIENT)
Dept: VASCULAR SURGERY | Age: 77
End: 2017-08-08

## 2017-08-08 NOTE — TELEPHONE ENCOUNTER
Physical therapy called and they need an order faxed to them at [phone]. Patient starts therapy tomorrow morning.

## 2017-09-26 ENCOUNTER — HOSPITAL ENCOUNTER (OUTPATIENT)
Dept: LAB | Age: 77
Discharge: HOME | End: 2017-09-26
Payer: MEDICARE

## 2017-09-26 ENCOUNTER — OFFICE VISIT (OUTPATIENT)
Dept: VASCULAR SURGERY | Age: 77
End: 2017-09-26

## 2017-09-26 VITALS
RESPIRATION RATE: 18 BRPM | BODY MASS INDEX: 24.17 KG/M2 | DIASTOLIC BLOOD PRESSURE: 72 MMHG | HEIGHT: 61 IN | HEART RATE: 70 BPM | WEIGHT: 128 LBS | SYSTOLIC BLOOD PRESSURE: 126 MMHG

## 2017-09-26 DIAGNOSIS — I73.9 PERIPHERAL ARTERIAL DISEASE (HCC): ICD-10-CM

## 2017-09-26 DIAGNOSIS — R10.31 BILATERAL GROIN PAIN: ICD-10-CM

## 2017-09-26 DIAGNOSIS — R10.32 BILATERAL GROIN PAIN: ICD-10-CM

## 2017-09-26 DIAGNOSIS — I73.9 PERIPHERAL ARTERIAL DISEASE (HCC): Primary | ICD-10-CM

## 2017-09-26 DIAGNOSIS — I77.811 AORTIC ECTASIA, ABDOMINAL (HCC): ICD-10-CM

## 2017-09-26 LAB
ALBUMIN SERPL-MCNC: 3.6 G/DL (ref 3.4–5)
ALBUMIN/GLOB SERPL: 1.2 {RATIO} (ref 0.8–1.7)
ALP SERPL-CCNC: 66 U/L (ref 45–117)
ALT SERPL-CCNC: 20 U/L (ref 13–56)
ANION GAP SERPL CALC-SCNC: 9 MMOL/L (ref 3–18)
AST SERPL-CCNC: 19 U/L (ref 15–37)
BILIRUB SERPL-MCNC: 0.4 MG/DL (ref 0.2–1)
BUN SERPL-MCNC: 35 MG/DL (ref 7–18)
BUN/CREAT SERPL: 24 (ref 12–20)
CALCIUM SERPL-MCNC: 9 MG/DL (ref 8.5–10.1)
CHLORIDE SERPL-SCNC: 104 MMOL/L (ref 100–108)
CO2 SERPL-SCNC: 28 MMOL/L (ref 21–32)
CREAT SERPL-MCNC: 1.43 MG/DL (ref 0.6–1.3)
ERYTHROCYTE [DISTWIDTH] IN BLOOD BY AUTOMATED COUNT: 13.4 % (ref 11.6–14.5)
GLOBULIN SER CALC-MCNC: 3.1 G/DL (ref 2–4)
GLUCOSE SERPL-MCNC: 102 MG/DL (ref 74–99)
HCT VFR BLD AUTO: 36.8 % (ref 35–45)
HGB BLD-MCNC: 12.4 G/DL (ref 12–16)
INR PPP: 1 (ref 0.8–1.2)
MCH RBC QN AUTO: 29 PG (ref 24–34)
MCHC RBC AUTO-ENTMCNC: 33.7 G/DL (ref 31–37)
MCV RBC AUTO: 86.2 FL (ref 74–97)
PLATELET # BLD AUTO: 272 K/UL (ref 135–420)
PMV BLD AUTO: 9.7 FL (ref 9.2–11.8)
POTASSIUM SERPL-SCNC: 4.1 MMOL/L (ref 3.5–5.5)
PROT SERPL-MCNC: 6.7 G/DL (ref 6.4–8.2)
PROTHROMBIN TIME: 12.4 SEC (ref 11.5–15.2)
RBC # BLD AUTO: 4.27 M/UL (ref 4.2–5.3)
SODIUM SERPL-SCNC: 141 MMOL/L (ref 136–145)
WBC # BLD AUTO: 7.1 K/UL (ref 4.6–13.2)

## 2017-09-26 PROCEDURE — 36415 COLL VENOUS BLD VENIPUNCTURE: CPT | Performed by: SURGERY

## 2017-09-26 PROCEDURE — 80053 COMPREHEN METABOLIC PANEL: CPT | Performed by: SURGERY

## 2017-09-26 PROCEDURE — 85027 COMPLETE CBC AUTOMATED: CPT | Performed by: SURGERY

## 2017-09-26 PROCEDURE — 85610 PROTHROMBIN TIME: CPT | Performed by: SURGERY

## 2017-09-26 RX ORDER — RAMIPRIL 10 MG/1
CAPSULE ORAL
COMMUNITY
Start: 2017-09-14 | End: 2017-09-26 | Stop reason: SDUPTHER

## 2017-09-26 RX ORDER — GABAPENTIN 100 MG/1
CAPSULE ORAL
Refills: 0 | COMMUNITY
Start: 2017-09-11 | End: 2017-09-26 | Stop reason: SDUPTHER
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
## 2017-09-26 NOTE — MR AVS SNAPSHOT
Visit Information Date & Time Provider Department Dept. Phone Encounter #  
 9/26/2017 10:15 AM Samantha Valverde MD [facility] [address] [id number] Your Appointments 10/17/2017 10:30 AM  
Follow Up with Samantha Valverde MD  
BS Vein/Vascular Spec THE [facility] ([address]) Appt Note: 2 week FU from angio and labs [place] [address] Upcoming Health Maintenance Date Due DTaP/Tdap/Td series (1 - Tdap) 10/29/1963 FOBT Q 1 YEAR AGE 50-75 10/29/1992 ZOSTER VACCINE AGE 60> 8/29/2002 GLAUCOMA SCREENING Q2Y 10/29/2007 MEDICARE YEARLY EXAM 10/29/2007 Pneumococcal 65+ Low/Medium Risk (2 of 2 - PCV13) 4/16/2015 INFLUENZA AGE 9 TO ADULT 8/1/2017 Allergies as of 9/26/2017  Review Complete On: 9/26/2017 By: Siva Gutierrez RN Severity Noted Reaction Type Reactions Oxybutynin Chloride Medium 05/13/2016   Systemic Swelling, Contact Dermatitis Minocycline  09/10/2012    Itching Nsaids (Non-steroidal Anti-inflammatory Drug)  09/10/2012    Other (comments) Mess stomach up Oragrafin  09/10/2012    Rash, Swelling Other Medication  03/01/2017    Other (comments) \"chloride tabs\"- \"critical\" Sulfa (Sulfonamide Antibiotics)  09/10/2012    Hives, Swelling Current Immunizations  Reviewed on 4/17/2014 Name Date Influenza Vaccine 10/1/2013 Pneumococcal Polysaccharide (PPSV-23) 4/16/2014  6:45 AM,  Deferred (Patient Refused) Not reviewed this visit You Were Diagnosed With   
  
 Codes Comments [facility]    -  Primary ICD-10-CM: I73.9 ICD-9-CM: 443. 9 Vitals BP Pulse Resp Height(growth percentile) Weight(growth percentile) BMI  
 126/72 (BP 1 Location: Left arm, BP Patient Position: Sitting) 70 18 5' 1\" (1.549 m) 128 lb (58.1 kg) 24.19 kg/m2 OB Status Smoking Status Hysterectomy Former Smoker Vitals History BMI and BSA Data Body Mass Index Body Surface Area  
 24.19 kg/m 2 1.58 m 2 Preferred Pharmacy Pharmacy Name Phone [phone] [phone] Your Updated Medication List  
  
   
This list is accurate as of: 9/26/17 10:39 AM.  Always use your most recent med list.  
  
  
  
  
 albuterol 90 mcg/actuation inhaler Commonly known as:  PROVENTIL HFA, VENTOLIN HFA, PROAIR HFA Take 1 Puff by inhalation every four (4) hours as needed. Indications: CHRONIC OBSTRUCTIVE PULMONARY DISEASE ALPHAGAN P 0.1 % ophthalmic solution Generic drug:  brimonidine Administer  to both eyes two (2) times a day. Indications: glaucoma  
  
 aspirin 81 mg chewable tablet Take 1 Tab by mouth daily. BROVANA 15 mcg/2 mL Nebu neb solution Generic drug:  arformoterol 15 mcg by Nebulization route two (2) times a day. Pt instructed to take am of surgery. Indications: COPD ASSOCIATED WITH CHRONIC BRONCHITIS buPROPion 100 mg tablet Commonly known as:  [facility] Take 200 mg by mouth two (2) times a day. Indications: MAJOR DEPRESSIVE DISORDER  
  
 calcium 500 mg Tab Take 2 Tabs by mouth daily. cholecalciferol (vitamin D3) 2,000 unit Tab Commonly known as:  VITAMIN D3 Take 5,000 mg by mouth daily. escitalopram oxalate 10 mg tablet Commonly known as:  Violeta Polio Take 10 mg by mouth daily. ferrous sulfate 325 mg (65 mg iron) tablet Take  by mouth Daily (before breakfast). Only takes 4 times per week. FML LIQUIFILM 0.1 % ophthalmic suspension Generic drug:  fluorometholone Administer 1 Drop to both eyes two (2) times a day. Indications: Dry eyes HYDROcodone-acetaminophen 5-325 mg per tablet Commonly known as:  Stephen Radford Take 1 Tab by mouth every four (4) hours as needed for Pain.  Max Daily Amount: 6 Tabs. indapamide 2.5 mg tablet Commonly known as:  Brianna Hickory Take  by mouth daily. levothyroxine 88 mcg tablet Commonly known as:  SYNTHROID Take 88 mcg by mouth Daily (before breakfast). Pt instructed to take am of surgery. Indications: HYPOTHYROIDISM  
  
 magnesium chloride 64 mg tablet Commonly known as:  MAG DELAY Take 1 Tab by mouth daily. melatonin Tab tablet Take 10 mg by mouth nightly. MYRBETRIQ 50 mg ER tablet Generic drug:  mirabegron ER Take 50 mg by mouth daily. NEURONTIN 300 mg capsule Generic drug:  gabapentin Take 300 mg by mouth nightly. nicotinic acid 500 mg tablet Commonly known as:  NIACIN Take 1 Tab by mouth Daily (before breakfast). Omeprazole delayed release 20 mg tablet Commonly known as:  PRILOSEC D/R Take 20 mg by mouth daily. OTHER  
HOME OXYGEN  
  
 PLAVIX 75 mg Tab Generic drug:  clopidogrel Take 75 mg by mouth daily. Indications: Cardiac stent  
  
 pravastatin 40 mg tablet Commonly known as:  PRAVACHOL Take 80 mg by mouth nightly. Indications: HYPERCHOLESTEROLEMIA  
  
 ramipril 10 mg capsule Commonly known as:  ALTACE Take 10 mg by mouth daily. Pt instructed to take am of surgery. Indications: HYPERTENSION  
  
 RESTASIS 0.05 % ophthalmic emulsion Generic drug:  cycloSPORINE Administer 1 Drop to both eyes two (2) times a day. Indications: DRY EYE  
  
 tolterodine ER 4 mg ER capsule Commonly known as:  Lydia Bun Take 4 mg by mouth daily. VALIUM 10 mg tablet Generic drug:  diazePAM  
Take 10 mg by mouth every eight (8) hours as needed. Indications: ANXIETY To-Do List   
 09/26/2017 Lab:  METABOLIC PANEL, COMPREHENSIVE   
  
 09/26/2017 Lab:  PROTHROMBIN TIME + INR Around 10/26/2017 Lab:  CBC W/O DIFF Please provide this summary of care documentation to your next provider. Your primary care clinician is listed as Meena Denny. If you have any questions after today's visit, please call [phone].

## 2017-09-29 RX ORDER — AMLODIPINE BESYLATE 5 MG/1
5 TABLET ORAL
COMMUNITY

## 2017-09-29 RX ORDER — CARVEDILOL 6.25 MG/1
6.25 TABLET ORAL 2 TIMES DAILY WITH MEALS
COMMUNITY

## 2017-09-29 RX ORDER — DULOXETIN HYDROCHLORIDE 60 MG/1
60 CAPSULE, DELAYED RELEASE ORAL 2 TIMES DAILY
COMMUNITY

## 2017-10-09 ENCOUNTER — HOSPITAL ENCOUNTER (OUTPATIENT)
Dept: INTERVENTIONAL RADIOLOGY/VASCULAR | Age: 77
Discharge: HOME | End: 2017-10-09
Attending: SURGERY | Admitting: SURGERY
Payer: MEDICARE

## 2017-10-09 VITALS
RESPIRATION RATE: 18 BRPM | WEIGHT: 130 LBS | HEIGHT: 60 IN | OXYGEN SATURATION: 97 % | HEART RATE: 58 BPM | DIASTOLIC BLOOD PRESSURE: 96 MMHG | SYSTOLIC BLOOD PRESSURE: 112 MMHG | TEMPERATURE: 97.7 F | BODY MASS INDEX: 25.52 KG/M2

## 2017-10-09 DIAGNOSIS — M79.606 ISCHEMIC REST PAIN OF LOWER EXTREMITY: ICD-10-CM

## 2017-10-09 DIAGNOSIS — I99.8 ISCHEMIC REST PAIN OF LOWER EXTREMITY: ICD-10-CM

## 2017-10-09 DIAGNOSIS — M79.606 LEG PAIN: ICD-10-CM

## 2017-10-09 LAB
ANION GAP SERPL CALC-SCNC: 10 MMOL/L (ref 3–18)
BUN SERPL-MCNC: 21 MG/DL (ref 7–18)
BUN/CREAT SERPL: 18 (ref 12–20)
CALCIUM SERPL-MCNC: 8.1 MG/DL (ref 8.5–10.1)
CHLORIDE SERPL-SCNC: 112 MMOL/L (ref 100–108)
CO2 SERPL-SCNC: 22 MMOL/L (ref 21–32)
CREAT SERPL-MCNC: 1.16 MG/DL (ref 0.6–1.3)
GLUCOSE SERPL-MCNC: 84 MG/DL (ref 74–99)
POTASSIUM SERPL-SCNC: 3.3 MMOL/L (ref 3.5–5.5)
SODIUM SERPL-SCNC: 144 MMOL/L (ref 136–145)

## 2017-10-09 PROCEDURE — 80048 BASIC METABOLIC PNL TOTAL CA: CPT | Performed by: SURGERY

## 2017-10-09 PROCEDURE — 74011250636 HC RX REV CODE- 250/636: Performed by: SURGERY

## 2017-10-09 PROCEDURE — 99153 MOD SED SAME PHYS/QHP EA: CPT

## 2017-10-09 PROCEDURE — 75625 CONTRAST EXAM ABDOMINL AORTA: CPT

## 2017-10-09 PROCEDURE — 74011000250 HC RX REV CODE- 250: Performed by: SURGERY

## 2017-10-09 PROCEDURE — 99152 MOD SED SAME PHYS/QHP 5/>YRS: CPT

## 2017-10-09 RX ORDER — HEPARIN SODIUM 1000 [USP'U]/ML
INJECTION, SOLUTION INTRAVENOUS; SUBCUTANEOUS
Status: DISCONTINUED
Start: 2017-10-09 | End: 2017-10-09 | Stop reason: HOSPADM

## 2017-10-09 RX ORDER — HEPARIN SODIUM 200 [USP'U]/100ML
1000 INJECTION, SOLUTION INTRAVENOUS
Status: COMPLETED | OUTPATIENT
Start: 2017-10-09 | End: 2017-10-09

## 2017-10-09 RX ORDER — NALOXONE HYDROCHLORIDE 0.4 MG/ML
0.2 INJECTION, SOLUTION INTRAMUSCULAR; INTRAVENOUS; SUBCUTANEOUS
Status: DISCONTINUED | OUTPATIENT
Start: 2017-10-09 | End: 2017-10-09 | Stop reason: HOSPADM

## 2017-10-09 RX ORDER — FENTANYL CITRATE 50 UG/ML
25-200 INJECTION, SOLUTION INTRAMUSCULAR; INTRAVENOUS
Status: DISCONTINUED | OUTPATIENT
Start: 2017-10-09 | End: 2017-10-09 | Stop reason: HOSPADM

## 2017-10-09 RX ORDER — OXYCODONE AND ACETAMINOPHEN 5; 325 MG/1; MG/1
1 TABLET ORAL
Status: DISCONTINUED | OUTPATIENT
Start: 2017-10-09 | End: 2017-10-09 | Stop reason: HOSPADM

## 2017-10-09 RX ORDER — ESTRADIOL 0.1 MG/G
2 CREAM VAGINAL
COMMUNITY
End: 2018-12-12

## 2017-10-09 RX ORDER — SODIUM CHLORIDE 9 MG/ML
25 INJECTION, SOLUTION INTRAVENOUS CONTINUOUS
Status: DISCONTINUED | OUTPATIENT
Start: 2017-10-09 | End: 2017-10-09 | Stop reason: HOSPADM

## 2017-10-09 RX ORDER — MIDAZOLAM HYDROCHLORIDE 1 MG/ML
1-4 INJECTION, SOLUTION INTRAMUSCULAR; INTRAVENOUS
Status: DISCONTINUED | OUTPATIENT
Start: 2017-10-09 | End: 2017-10-09 | Stop reason: HOSPADM

## 2017-10-09 RX ORDER — LIDOCAINE HYDROCHLORIDE 10 MG/ML
1-10 INJECTION, SOLUTION EPIDURAL; INFILTRATION; INTRACAUDAL; PERINEURAL
Status: COMPLETED | OUTPATIENT
Start: 2017-10-09 | End: 2017-10-09

## 2017-10-09 RX ORDER — FLUMAZENIL 0.1 MG/ML
0.2 INJECTION INTRAVENOUS AS NEEDED
Status: DISCONTINUED | OUTPATIENT
Start: 2017-10-09 | End: 2017-10-09 | Stop reason: HOSPADM

## 2017-10-09 RX ADMIN — MIDAZOLAM HYDROCHLORIDE 0.5 MG: 1 INJECTION, SOLUTION INTRAMUSCULAR; INTRAVENOUS at 07:56

## 2017-10-09 RX ADMIN — SODIUM CHLORIDE 999 ML/HR: 900 INJECTION, SOLUTION INTRAVENOUS at 08:38

## 2017-10-09 RX ADMIN — FENTANYL CITRATE 25 MCG: 50 INJECTION, SOLUTION INTRAMUSCULAR; INTRAVENOUS at 08:05

## 2017-10-09 RX ADMIN — FENTANYL CITRATE 25 MCG: 50 INJECTION, SOLUTION INTRAMUSCULAR; INTRAVENOUS at 07:59

## 2017-10-09 RX ADMIN — FENTANYL CITRATE 25 MCG: 50 INJECTION, SOLUTION INTRAMUSCULAR; INTRAVENOUS at 07:54

## 2017-10-09 RX ADMIN — HEPARIN SODIUM 2000 UNITS: 200 INJECTION, SOLUTION INTRAVENOUS at 07:48

## 2017-10-09 RX ADMIN — LIDOCAINE HYDROCHLORIDE 10 ML: 10 INJECTION, SOLUTION EPIDURAL; INFILTRATION; INTRACAUDAL; PERINEURAL at 08:00

## 2017-10-09 RX ADMIN — MIDAZOLAM HYDROCHLORIDE 0.5 MG: 1 INJECTION, SOLUTION INTRAMUSCULAR; INTRAVENOUS at 07:54

## 2017-10-09 RX ADMIN — SODIUM CHLORIDE 25 ML/HR: 900 INJECTION, SOLUTION INTRAVENOUS at 07:22

## 2017-10-09 RX ADMIN — MIDAZOLAM HYDROCHLORIDE 0.5 MG: 1 INJECTION, SOLUTION INTRAMUSCULAR; INTRAVENOUS at 08:05

## 2017-10-09 RX ADMIN — MIDAZOLAM HYDROCHLORIDE 0.5 MG: 1 INJECTION, SOLUTION INTRAMUSCULAR; INTRAVENOUS at 08:15

## 2017-10-09 RX ADMIN — FENTANYL CITRATE 12.5 MCG: 50 INJECTION, SOLUTION INTRAMUSCULAR; INTRAVENOUS at 08:15

## 2017-10-09 NOTE — BRIEF OP NOTE
BRIEF OPERATIVE NOTE    Date of Procedure: 10/9/2017   Preoperative Diagnosis: Bilateral hip pain, peripheral arterial disease  Postoperative Diagnosis: Bilateral hip pain, peripheral arterial disease    Procedure: CO2 aortogram with pelvic angiogram  Surgeon: Mine Shipman MD  Anesthesia: Moderate sedation with local  Estimated Blood Loss: Minimal  Specimens: * Cannot find log *   Findings: No hemodynamically significant aortic or iliac disease.   Infrarenal AAA   Complications: None  Implants: * No surgery found *

## 2017-10-09 NOTE — OP NOTES
[address] [place] [address]  OPERATIVE REPORT    Name:  Benson Quan  MR#:  [id number]  :  1940  Account #:  [de-identified]  Date of Adm:  10/09/2017  Date of Surgery:  10/09/2017      PREOPERATIVE DIAGNOSIS: Bilateral hip pain with peripheral  arterial disease and history of an infrarenal aortic aneurysm. POSTOPERATIVE DIAGNOSIS: Bilateral hip pain with peripheral  arterial disease and history of an infrarenal aortic aneurysm. PROCEDURES PERFORMED: CO2 aortogram with bilateral pelvic  angiogram.    SURGEON: Adolfo Tubbs MD.    ANESTHESIA: Moderate sedation with local.    PACKS AND DRAINS: None. IMPLANTS: None. SPECIMENS REMOVED: None. COMPLICATIONS: None. CONDITION: To recovery stable. ESTIMATED BLOOD LOSS;    FINDINGS: Suboptimal visualization of the aortic and pelvic  vasculature, but no evidence of any hemodynamically significant renal  artery or iliac artery stenoses. Patent bilateral external iliac artery  stents, no evidence of dissection. INDICATIONS FOR PROCEDURE: The patient is a 29-year-old  female with known history of peripheral arterial disease, status post a  left lower extremity bypass known to be occluded and a patent right  lower extremity bypass who previously had bilateral external iliac artery  stents placed. The patient has subsequently been developing  worsening hip pain with ambulation and it was noted the patient had a  drop in her arterial brachial indices. Given these findings, decision was  made to take the patient to the catheterization suite for an aortogram  and possible intervention. Informed consent was obtained. DESCRIPTION OF PROCEDURE: On 10/09/2017 the  patient presented to the catheterization suite, identified by name and  ID bracelet by myself and entire operative team. Once this was done,  the patient was placed on the catheterization table in supine position.   After appropriate depth of anesthesia obtained, the patient was  prepped and draped and time-out performed. At this point, we then  interrogated the right groin with ultrasound to evaluate the femoral  artery, it was pulsatile and patent. Appeared to be appropriate site for  access, 1% lidocaine used to anesthetize the right groin for and  ultrasound guided percutaneous access of the right common femoral  artery was obtained. We then advanced a Bentson wire up into the  abdominal aorta, followed by a 5-Maldivian sheath and a flush catheter. Once this  was completed, we did a CO2 aortogram. This showed no evidence of  any renal artery stenosis with a positive infrarenal abdominal aortic  aneurysm. There was some concern about a possible external short  common iliac artery stenosis on the right and an external iliac artery  stenosis on the left. We tried multiple projections with CO2 to visualize  the area better and after obtaining a 180 degrees view of the bilateral  iliac vessels it appeared that these vessels were not in fact stenotic;  however, this was just bowel gas artifact. Happy with our results, we  then removed the catheter wire and sheath and held manual pressure  for hemostasis. At the end of the procedure, all counts were correct  x2. I was present and scrubbed for the entire procedure.         MD Trung Allen / Paris Jain  D:  10/09/2017   08:51  T:  10/09/2017   09:17  Job #:  [id number]

## 2017-10-09 NOTE — INTERVAL H&P NOTE
H&P Update:  Abdoulaye Lee was seen and examined. History and physical has been reviewed. The patient has been examined.  There have been no significant clinical changes since the completion of the originally dated History and Physical.    Signed By: Nydia Lucas MD     October 9, 2017 7:39 AM
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
## 2017-10-09 NOTE — PROGRESS NOTES
Pt discharged to home. Under care of friend. Denies any pain or discomfort. Patient armband removed and shredded.

## 2017-10-09 NOTE — DISCHARGE INSTRUCTIONS
Angiogram: What to Expect at Home  Your Recovery  An angiogram is an X-ray test that uses dye and a camera to take pictures of the blood flow in an artery or a vein. The doctor inserted a thin, flexible tube (catheter) into a blood vessel in your groin. In some cases, the catheter is placed in a blood vessel in the arm. An angiogram is done for many reasons. For example, you may have an angiogram to find the source of bleeding, such as an ulcer. Or it may be done to look for blocked blood vessels in your lungs. After an angiogram, your groin or arm may have a bruise and feel sore for a day or two. You can do light activities around the house but nothing strenuous for several days. Your doctor may give you specific instructions on when you can do your normal activities again, such as driving and going back to work. This care sheet gives you a general idea about how long it will take for you to recover. But each person recovers at a different pace. Follow the steps below to feel better as quickly as possible. How can you care for yourself at home? Activity  · Do not do strenuous exercise and do not lift, pull, or push anything heavy until your doctor says it is okay. This may be for a day or two. You can walk around the house and do light activity, such as cooking. · You may shower 24 to 48 hours after the procedure, if your doctor okays it. Pat the incision dry. Do not take a bath for 1 week, or until your doctor tells you it is okay. · If the catheter was placed in your groin, try not to walk up stairs for the first couple of days. · If the catheter was placed in your arm near your wrist, do not bend your wrist deeply for the first couple of days. Be careful using your hand to get into and out of a chair or bed. · If your doctor recommends it, get more exercise. Walking is a good choice. Bit by bit, increase the amount you walk every day.  Try for at least 30 minutes on most days of the week.  Diet  · Drink plenty of fluids to help your body flush out the dye. If you have kidney, heart, or liver disease and have to limit fluids, talk with your doctor before you increase the amount of fluids you drink. · You can eat your normal diet. If your stomach is upset, try bland, low-fat foods like plain rice, broiled chicken, toast, and yogurt. Medicines  · Be safe with medicines. Read and follow all instructions on the label. ¨ If the doctor gave you a prescription medicine for pain, take it as prescribed. ¨ If you are not taking a prescription pain medicine, ask your doctor if you can take an over-the-counter medicine. · If you take blood thinners, such as warfarin (Coumadin), clopidogrel (Plavix), or aspirin, be sure to talk to your doctor. He or she will tell you if and when to start taking those medicines again. Make sure that you understand exactly what your doctor wants you to do. · Your doctor will tell you if and when you can restart your medicines. He or she will also give you instructions about taking any new medicines. Care of the catheter site  · You will have a dressing over the cut (incision). A dressing helps the incision heal and protects it. Your doctor will tell you how to take care of this. · Put ice or a cold pack on the area for 10 to 20 minutes at a time to help with soreness or swelling. Put a thin cloth between the ice and your skin. Follow-up care is a key part of your treatment and safety. Be sure to make and go to all appointments, and call your doctor if you are having problems. It's also a good idea to know your test results and keep a list of the medicines you take. When should you call for help? Call 911 anytime you think you may need emergency care. For example, call if:  · You passed out (lost consciousness). · You have severe trouble breathing. · You have sudden chest pain and shortness of breath, or you cough up blood.   Call your doctor now or seek immediate medical care if:  · You are bleeding from the area where the catheter was put in your artery. · You have a fast-growing, painful lump at the catheter site. · You have signs of infection, such as:  ¨ Increased pain, swelling, warmth, or redness. ¨ Red streaks leading from the incision. ¨ Pus draining from the incision. ¨ A fever. Watch closely for any changes in your health, and be sure to contact your doctor if:  · You don't get better as expected. Where can you learn more? Go to http://niharika-karen.info/. Enter D256 in the search box to learn more about \"Angiogram: What to Expect at Home. \"  Current as of: October 14, 2016  Content Version: 11.3  © 8499-0874 [employer]. Care instructions adapted under license by [employer] (which disclaims liability or warranty for this information). If you have questions about a medical condition or this instruction, always ask your healthcare professional. [place] [address] any warranty or liability for your use of this information. Sedation for a Medical Procedure: Care Instructions  Your Care Instructions  For a minor procedure or surgery, you will get a sedative to help you relax. This drug will make you sleepy. It is usually given in a vein (by IV). A shot may also be used to numb the area. If you had local anesthesia, you may feel some pain and discomfort as it wears off. If you have pain, don't be afraid to say so. Pain medicine works better if you take it before the pain gets bad. Common side effects from sedation include:  · Feeling sleepy. (Your doctors and nurses will make sure you are not too sleepy to go home.)  · Nausea and vomiting. This usually does not last long. · Feeling tired. Follow-up care is a key part of your treatment and safety. Be sure to make and go to all appointments, and call your doctor if you are having problems.  It's also a good idea to know your test results and keep a list of the medicines you take. How can you care for yourself at home? Activity  · Don't do anything for 24 hours that requires attention to detail. It takes time for the medicine effects to completely wear off. · For your safety, you should not drive or operate any machinery that could be dangerous until the medicine wears off and you can think clearly and react easily. · Rest when you feel tired. Getting enough sleep will help you recover. Diet  · You can eat your normal diet, unless your doctor gives you other instructions. If your stomach is upset, try clear liquids and bland, low-fat foods like plain toast or rice. · Drink plenty of fluids (unless your doctor tells you not to). · Don't drink alcohol for 24 hours. Medicines  · Be safe with medicines. Read and follow all instructions on the label. ¨ If the doctor gave you a prescription medicine for pain, take it as prescribed. ¨ If you are not taking a prescription pain medicine, ask your doctor if you can take an over-the-counter medicine. · If you think your pain medicine is making you sick to your stomach:  ¨ Take your medicine after meals (unless your doctor has told you not to). ¨ Ask your doctor for a different pain medicine. When should you call for help? Call 911 anytime you think you may need emergency care. For example, call if:  · You have severe trouble breathing. · You passed out (lost consciousness). Call your doctor now or seek immediate medical care if:  · You have trouble breathing. · You have ongoing or worsening nausea or vomiting. · You have a fever. · You have a new or worse headache. · The medicine is not wearing off and you can't think clearly. Watch closely for changes in your health, and be sure to contact your doctor if:  · You do not get better as expected. Where can you learn more? Go to http://niharika-karen.info/.   Enter P556 in the search box to learn more about \"Sedation for a Medical Procedure: Care Instructions. \"  Current as of: August 14, 2016  Content Version: 11.3  © 6478-4773 [employer]. Care instructions adapted under license by [employer] (which disclaims liability or warranty for this information). If you have questions about a medical condition or this instruction, always ask your healthcare professional. [address] any warranty or liability for your use of this information. DISCHARGE SUMMARY from Nurse    The following personal items are in your possession at time of discharge:       Visual Aid: Glasses                            PATIENT INSTRUCTIONS:    After general anesthesia or intravenous sedation, for 24 hours or while taking prescription Narcotics:  · Limit your activities  · Do not drive and operate hazardous machinery  · Do not make important personal or business decisions  · Do  not drink alcoholic beverages  · If you have not urinated within 8 hours after discharge, please contact your surgeon on call. Report the following to your surgeon:  · Excessive pain, swelling, redness or odor of or around the surgical area  · Temperature over 100.5  · Nausea and vomiting lasting longer than 4 hours or if unable to take medications  · Any signs of decreased circulation or nerve impairment to extremity: change in color, persistent  numbness, tingling, coldness or increase pain  · Any questions        What to do at Home:  Recommended activity: Activity as tolerated and no driving for today,   *  Please give a list of your current medications to your Primary Care Provider. *  Please update this list whenever your medications are discontinued, doses are      changed, or new medications (including over-the-counter products) are added. *  Please carry medication information at all times in case of emergency situations.           These are general instructions for a healthy lifestyle:    No smoking/ No tobacco products/ Avoid exposure to second hand smoke    Surgeon General's Warning:  Quitting smoking now greatly reduces serious risk to your health. Obesity, smoking, and sedentary lifestyle greatly increases your risk for illness    A healthy diet, regular physical exercise & weight monitoring are important for maintaining a healthy lifestyle    You may be retaining fluid if you have a history of heart failure or if you experience any of the following symptoms:  Weight gain of 3 pounds or more overnight or 5 pounds in a week, increased swelling in our hands or feet or shortness of breath while lying flat in bed. Please call your doctor as soon as you notice any of these symptoms; do not wait until your next office visit. Recognize signs and symptoms of STROKE:    F-face looks uneven    A-arms unable to move or move unevenly    S-speech slurred or non-existent    T-time-call 911 as soon as signs and symptoms begin-DO NOT go       Back to bed or wait to see if you get better-TIME IS BRAIN. Warning Signs of HEART ATTACK     Call 911 if you have these symptoms:   Chest discomfort. Most heart attacks involve discomfort in the center of the chest that lasts more than a few minutes, or that goes away and comes back. It can feel like uncomfortable pressure, squeezing, fullness, or pain.  Discomfort in other areas of the upper body. Symptoms can include pain or discomfort in one or both arms, the back, neck, jaw, or stomach.  Shortness of breath with or without chest discomfort.  Other signs may include breaking out in a cold sweat, nausea, or lightheadedness. Don't wait more than five minutes to call 911 - MINUTES MATTER! Fast action can save your life. Calling 911 is almost always the fastest way to get lifesaving treatment. Emergency Medical Services staff can begin treatment when they arrive -- up to an hour sooner than if someone gets to the hospital by car.        The discharge information has been reviewed with the patient and caregiver. The patient and caregiver verbalized understanding. Discharge medications reviewed with the patient and caregiver and appropriate educational materials and side effects teaching were provided.

## 2017-10-09 NOTE — IP AVS SNAPSHOT
Sanjay Clonts 
 
 
 [address] [facility] [address] 
[phone] Patient: Ulysses Hart MRN: [id number] WLA:[birth year] You are allergic to the following Allergen Reactions Oxybutynin Chloride Swelling Contact Dermatitis Minocycline Itching Nsaids (Non-Steroidal Anti-Inflammatory Drug) Other (comments) Mess stomach up Oragrafin Rash Swelling Other Medication Other (comments) \"chloride tabs\"- \"critical\" Sulfa (Sulfonamide Antibiotics) Hives Swelling Recent Documentation Height Weight BMI OB Status Smoking Status 1.524 m 59 kg 25.39 kg/m2 Hysterectomy Former Smoker Unresulted Labs Order Current Status METABOLIC PANEL, BASIC In process Emergency Contacts Name Discharge Info Relation Home Work Mobile [facility] DIVISION DISCHARGE CAREGIVER [3] Son [22] [phone] AndreiRadha  Other Relative [6] [phone] [facility]  Son [22] [phone] About your hospitalization You were admitted on:  October 9, 2017 You last received care in the:  [address] You were discharged on:  October 9, 2017 Unit phone number:  [phone] Why you were hospitalized Your primary diagnosis was:  Not on File Providers Seen During Your Hospitalizations Provider Role Specialty Primary office phone James Dukes MD Attending Provider Vascular Surgery [phone] Your Primary Care Physician (PCP) Primary Care Physician Office Phone Office Fax Lexi Matias [phone] Follow-up Information Follow up With Details Comments Contact Info Yasmin Day MD   [address] 
[phone] Your Appointments Tuesday October 17, 2017 10:30 AM EDT Follow Up with James Dukes MD  
 BS Vein/Vascular Spec THE [facility] (APRIL SCHEDULING)  
 [address]  
[phone] Current Discharge Medication List  
  
CONTINUE these medications which have CHANGED Dose & Instructions Dispensing Information Comments Morning Noon Evening Bedtime  
 aspirin 81 mg chewable tablet What changed:  when to take this Your last dose was: Your next dose is:    
   
   
 Dose:  81 mg Take 1 Tab by mouth daily. Quantity:  30 Tab Refills:  0 CONTINUE these medications which have NOT CHANGED Dose & Instructions Dispensing Information Comments Morning Noon Evening Bedtime  
 albuterol 90 mcg/actuation inhaler Commonly known as:  PROVENTIL HFA, VENTOLIN HFA, PROAIR HFA Your last dose was: Your next dose is:    
   
   
 Dose:  1 Puff Take 1 Puff by inhalation every four (4) hours as needed. Indications: CHRONIC OBSTRUCTIVE PULMONARY DISEASE Refills:  0 ALPHAGAN P 0.1 % ophthalmic solution Generic drug:  brimonidine Your last dose was: Your next dose is:    
   
   
 Administer  to both eyes two (2) times a day. Indications: glaucoma Refills:  0  
     
   
   
   
  
 amLODIPine 5 mg tablet Commonly known as:  Laurie Reyes Your last dose was: Your next dose is:    
   
   
 Dose:  7.5 mg Take 7.5 mg by mouth daily. Refills:  0 BROVANA 15 mcg/2 mL Nebu neb solution Generic drug:  arformoterol Your last dose was: Your next dose is:    
   
   
 Dose:  15 mcg 15 mcg by Nebulization route two (2) times a day. Pt instructed to take am of surgery. Indications: COPD ASSOCIATED WITH CHRONIC BRONCHITIS Refills:  0  
     
   
   
   
  
 buPROPion 100 mg tablet Commonly known as:  [facility] Your last dose was:     
   
Your next dose is:    
   
   
 Dose:  200 mg  
 Take 200 mg by mouth two (2) times a day. Indications: MAJOR DEPRESSIVE DISORDER Refills:  0  
     
   
   
   
  
 calcium 500 mg Tab Your last dose was: Your next dose is:    
   
   
 Dose:  2 Tab Take 2 Tabs by mouth daily. Quantity:  60 Tab Refills:  1  
     
   
   
   
  
 carvedilol 6.25 mg tablet Commonly known as:  Eva Debra Your last dose was: Your next dose is:    
   
   
 Dose:  6.25 mg Take 6.25 mg by mouth two (2) times daily (with meals). Refills:  0  
     
   
   
   
  
 cholecalciferol (vitamin D3) 2,000 unit Tab Commonly known as:  VITAMIN D3 Your last dose was: Your next dose is:    
   
   
 Dose:  5000 mg Take 5,000 mg by mouth daily. Quantity:  30 Tab Refills:  0 DULoxetine 60 mg capsule Commonly known as:  CYMBALTA Your last dose was: Your next dose is:    
   
   
 Dose:  60 mg Take 60 mg by mouth daily. Refills:  0  
     
   
   
   
  
 escitalopram oxalate 10 mg tablet Commonly known as:  Lobo Donte Your last dose was: Your next dose is:    
   
   
 Dose:  10 mg Take 10 mg by mouth daily. Refills:  0 ESTRACE 0.01 % (0.1 mg/gram) vaginal cream  
Generic drug:  estradiol Your last dose was: Your next dose is:    
   
   
 Dose:  2 g Insert 2 g into vagina daily. Refills:  0  
     
   
   
   
  
 ferrous sulfate 325 mg (65 mg iron) tablet Your last dose was: Your next dose is: Take  by mouth Daily (before breakfast). Only takes 4 times per week. Refills:  0  
     
   
   
   
  
 FISH  mg Cap Generic drug:  Omega-3 Fatty Acids Your last dose was: Your next dose is:    
   
   
 Dose:  300 mg Take 300 mg by mouth. Refills:  0 FML LIQUIFILM 0.1 % ophthalmic suspension Generic drug:  fluorometholone Your last dose was: Your next dose is:    
   
   
 Dose:  1 Drop Administer 1 Drop to both eyes two (2) times a day. Indications: Dry eyes Refills:  0  
     
   
   
   
  
 indapamide 2.5 mg tablet Commonly known as:  Lei Relic Your last dose was: Your next dose is: Take  by mouth daily. Refills:  0  
     
   
   
   
  
 levothyroxine 88 mcg tablet Commonly known as:  SYNTHROID Your last dose was: Your next dose is:    
   
   
 Dose:  88 mcg Take 88 mcg by mouth Daily (before breakfast). Pt instructed to take am of surgery. Indications: HYPOTHYROIDISM Refills:  0  
     
   
   
   
  
 magnesium chloride 64 mg tablet Commonly known as:  MAG DELAY Your last dose was: Your next dose is:    
   
   
 Dose:  64 mg Take 1 Tab by mouth daily. Quantity:  30 Tab Refills:  0 MYRBETRIQ 50 mg ER tablet Generic drug:  mirabegron ER Your last dose was: Your next dose is:    
   
   
 Dose:  50 mg Take 50 mg by mouth daily. Refills:  0 NEURONTIN 300 mg capsule Generic drug:  gabapentin Your last dose was: Your next dose is:    
   
   
 Dose:  300 mg Take 300 mg by mouth nightly. Refills:  0  
     
   
   
   
  
 nicotinic acid 500 mg tablet Commonly known as:  NIACIN Your last dose was: Your next dose is:    
   
   
 Dose:  500 mg Take 1 Tab by mouth Daily (before breakfast). Quantity:  1 Tab Refills:  0 Omeprazole delayed release 20 mg tablet Commonly known as:  PRILOSEC D/R Your last dose was: Your next dose is:    
   
   
 Dose:  20 mg Take 20 mg by mouth daily. Refills:  0  
     
   
   
   
  
 OTHER Your last dose was: Your next dose is:    
   
   
 HOME OXYGEN Quantity:  1 Int'l Units Refills:  0 PLAVIX 75 mg Tab Generic drug:  clopidogrel Your last dose was: Your next dose is:    
   
   
 Dose:  75 mg Take 75 mg by mouth daily. Indications: Cardiac stent Refills:  0  
     
   
   
   
  
 pravastatin 40 mg tablet Commonly known as:  PRAVACHOL Your last dose was: Your next dose is:    
   
   
 Dose:  80 mg Take 80 mg by mouth nightly. Indications: HYPERCHOLESTEROLEMIA Refills:  0  
     
   
   
   
  
 ramipril 10 mg capsule Commonly known as:  ALTACE Your last dose was: Your next dose is:    
   
   
 Dose:  10 mg Take 10 mg by mouth daily. Pt instructed to take am of surgery. Indications: HYPERTENSION Refills:  0  
     
   
   
   
  
 RESTASIS 0.05 % ophthalmic emulsion Generic drug:  cycloSPORINE Your last dose was: Your next dose is:    
   
   
 Dose:  1 Drop Administer 1 Drop to both eyes two (2) times a day. Indications: DRY EYE Refills:  0  
     
   
   
   
  
 tolterodine ER 4 mg ER capsule Commonly known as:  Laura Vega Your last dose was: Your next dose is:    
   
   
 Dose:  4 mg Take 4 mg by mouth daily. Refills:  0 VALIUM 10 mg tablet Generic drug:  diazePAM  
   
Your last dose was: Your next dose is:    
   
   
 Dose:  10 mg Take 10 mg by mouth every eight (8) hours as needed. Indications: ANXIETY Refills:  0 Discharge Instructions Angiogram: What to Expect at [facility] Your Recovery An angiogram is an X-ray test that uses dye and a camera to take pictures of the blood flow in an artery or a vein. The doctor inserted a thin, flexible tube (catheter) into a blood vessel in your groin. In some cases, the catheter is placed in a blood vessel in the arm. An angiogram is done for many reasons. For example, you may have an angiogram to find the source of bleeding, such as an ulcer. Or it may be done to look for blocked blood vessels in your lungs. After an angiogram, your groin or arm may have a bruise and feel sore for a day or two. You can do light activities around the house but nothing strenuous for several days. Your doctor may give you specific instructions on when you can do your normal activities again, such as driving and going back to work. This care sheet gives you a general idea about how long it will take for you to recover. But each person recovers at a different pace. Follow the steps below to feel better as quickly as possible. How can you care for yourself at home? Activity · Do not do strenuous exercise and do not lift, pull, or push anything heavy until your doctor says it is okay. This may be for a day or two. You can walk around the house and do light activity, such as cooking. · You may shower 24 to 48 hours after the procedure, if your doctor okays it. Pat the incision dry. Do not take a bath for 1 week, or until your doctor tells you it is okay. · If the catheter was placed in your groin, try not to walk up stairs for the first couple of days. · If the catheter was placed in your arm near your wrist, do not bend your wrist deeply for the first couple of days. Be careful using your hand to get into and out of a chair or bed. · If your doctor recommends it, get more exercise. Walking is a good choice. Bit by bit, increase the amount you walk every day. Try for at least 30 minutes on most days of the week. Diet · Drink plenty of fluids to help your body flush out the dye. If you have kidney, heart, or liver disease and have to limit fluids, talk with your doctor before you increase the amount of fluids you drink. · You can eat your normal diet. If your stomach is upset, try bland, low-fat foods like plain rice, broiled chicken, toast, and yogurt. Medicines · Be safe with medicines. Read and follow all instructions on the label. ¨ If the doctor gave you a prescription medicine for pain, take it as prescribed. ¨ If you are not taking a prescription pain medicine, ask your doctor if you can take an over-the-counter medicine. · If you take blood thinners, such as warfarin (Coumadin), clopidogrel (Plavix), or aspirin, be sure to talk to your doctor. He or she will tell you if and when to start taking those medicines again. Make sure that you understand exactly what your doctor wants you to do. · Your doctor will tell you if and when you can restart your medicines. He or she will also give you instructions about taking any new medicines. Care of the catheter site · You will have a dressing over the cut (incision). A dressing helps the incision heal and protects it. Your doctor will tell you how to take care of this. · Put ice or a cold pack on the area for 10 to 20 minutes at a time to help with soreness or swelling. Put a thin cloth between the ice and your skin. Follow-up care is a key part of your treatment and safety. Be sure to make and go to all appointments, and call your doctor if you are having problems. It's also a good idea to know your test results and keep a list of the medicines you take. When should you call for help? Call 911 anytime you think you may need emergency care. For example, call if: 
· You passed out (lost consciousness). · You have severe trouble breathing. · You have sudden chest pain and shortness of breath, or you cough up blood. Call your doctor now or seek immediate medical care if: 
· You are bleeding from the area where the catheter was put in your artery. · You have a fast-growing, painful lump at the catheter site. · You have signs of infection, such as: 
¨ Increased pain, swelling, warmth, or redness. ¨ Red streaks leading from the incision. ¨ Pus draining from the incision. ¨ A fever. Watch closely for any changes in your health, and be sure to contact your doctor if: 
· You don't get better as expected. Where can you learn more? Go to http://niharika-karen.info/. Enter J044 in the search box to learn more about \"Angiogram: What to Expect at Home. \" Current as of: October 14, 2016 Content Version: 11.3 © 9451-1570 [employer]. Care instructions adapted under license by [employer] (which disclaims liability or warranty for this information). If you have questions about a medical condition or this instruction, always ask your healthcare professional. [address] any warranty or liability for your use of this information. Sedation for a Medical Procedure: Care Instructions Your Care Instructions For a minor procedure or surgery, you will get a sedative to help you relax. This drug will make you sleepy. It is usually given in a vein (by IV). A shot may also be used to numb the area. If you had local anesthesia, you may feel some pain and discomfort as it wears off. If you have pain, don't be afraid to say so. Pain medicine works better if you take it before the pain gets bad. Common side effects from sedation include: · Feeling sleepy. (Your doctors and nurses will make sure you are not too sleepy to go home.) · Nausea and vomiting. This usually does not last long. · Feeling tired. Follow-up care is a key part of your treatment and safety. Be sure to make and go to all appointments, and call your doctor if you are having problems. It's also a good idea to know your test results and keep a list of the medicines you take. How can you care for yourself at home? Activity · Don't do anything for 24 hours that requires attention to detail. It takes time for the medicine effects to completely wear off. · For your safety, you should not drive or operate any machinery that could be dangerous until the medicine wears off and you can think clearly and react easily. · Rest when you feel tired. Getting enough sleep will help you recover. Diet · You can eat your normal diet, unless your doctor gives you other instructions. If your stomach is upset, try clear liquids and bland, low-fat foods like plain toast or rice. · Drink plenty of fluids (unless your doctor tells you not to). · Don't drink alcohol for 24 hours. Medicines · Be safe with medicines. Read and follow all instructions on the label. ¨ If the doctor gave you a prescription medicine for pain, take it as prescribed. ¨ If you are not taking a prescription pain medicine, ask your doctor if you can take an over-the-counter medicine. · If you think your pain medicine is making you sick to your stomach: 
¨ Take your medicine after meals (unless your doctor has told you not to). ¨ Ask your doctor for a different pain medicine. When should you call for help? Call 911 anytime you think you may need emergency care. For example, call if: 
· You have severe trouble breathing. · You passed out (lost consciousness). Call your doctor now or seek immediate medical care if: 
· You have trouble breathing. · You have ongoing or worsening nausea or vomiting. · You have a fever. · You have a new or worse headache. · The medicine is not wearing off and you can't think clearly. Watch closely for changes in your health, and be sure to contact your doctor if: 
· You do not get better as expected. Where can you learn more? Go to http://niharika-karen.info/. Enter H350 in the search box to learn more about \"Sedation for a Medical Procedure: Care Instructions. \" Current as of: August 14, 2016 Content Version: 11.3 © 5840-7148 [employer]. Care instructions adapted under license by [employer] (which disclaims liability or warranty for this information). If you have questions about a medical condition or this instruction, always ask your healthcare professional. [address] any warranty or liability for your use of this information. DISCHARGE SUMMARY from Nurse The following personal items are in your possession at time of discharge: 
 
  
Visual Aid: Glasses PATIENT INSTRUCTIONS: 
 
 
F-face looks uneven A-arms unable to move or move unevenly S-speech slurred or non-existent T-time-call 911 as soon as signs and symptoms begin-DO NOT go Back to bed or wait to see if you get better-TIME IS BRAIN. Warning Signs of HEART ATTACK Call 911 if you have these symptoms: 
? Chest discomfort. Most heart attacks involve discomfort in the center of the chest that lasts more than a few minutes, or that goes away and comes back. It can feel like uncomfortable pressure, squeezing, fullness, or pain. ? Discomfort in other areas of the upper body. Symptoms can include pain or discomfort in one or both arms, the back, neck, jaw, or stomach. ? Shortness of breath with or without chest discomfort. ? Other signs may include breaking out in a cold sweat, nausea, or lightheadedness. Don't wait more than five minutes to call [address]! Fast action can save your life. Calling 911 is almost always the fastest way to get lifesaving treatment. Emergency Medical Services staff can begin treatment when they arrive  up to an hour sooner than if someone gets to the hospital by car. The discharge information has been reviewed with the patient and caregiver. The patient and caregiver verbalized understanding. Discharge medications reviewed with the patient and caregiver and appropriate educational materials and side effects teaching were provided. Discharge Orders None General Information Please provide this summary of care documentation to your next provider. Patient Signature:  ____________________________________________________________ Date:  ____________________________________________________________  
  
Irving Naas Provider Signature:  ____________________________________________________________ Date:  ____________________________________________________________

## 2017-10-17 ENCOUNTER — OFFICE VISIT (OUTPATIENT)
Dept: VASCULAR SURGERY | Age: 77
End: 2017-10-17

## 2017-10-17 VITALS
RESPIRATION RATE: 18 BRPM | WEIGHT: 130 LBS | SYSTOLIC BLOOD PRESSURE: 118 MMHG | BODY MASS INDEX: 25.52 KG/M2 | DIASTOLIC BLOOD PRESSURE: 60 MMHG | HEIGHT: 60 IN | HEART RATE: 68 BPM

## 2017-10-17 DIAGNOSIS — M79.604 PAIN OF RIGHT LOWER EXTREMITY: ICD-10-CM

## 2017-10-17 DIAGNOSIS — I73.9 PERIPHERAL ARTERIAL DISEASE (HCC): Primary | ICD-10-CM

## 2017-10-17 DIAGNOSIS — I77.811 AORTIC ECTASIA, ABDOMINAL (HCC): ICD-10-CM

## 2017-10-17 NOTE — MR AVS SNAPSHOT
Visit Information Date & Time Provider Department Dept. Phone Encounter #  
 10/17/2017  9:15 AM Shani Fonseca MD BS Vein/Vascular Spec [address] [id number] Your Appointments 1/17/2018  9:15 AM  
Follow Up with Shani Fonseca MD  
[facility] [place] [facility] Appt Note: 3 month FU with Studies [place] [address] Upcoming Health Maintenance Date Due DTaP/Tdap/Td series (1 - Tdap) 10/29/1963 FOBT Q 1 YEAR AGE 50-75 10/29/1992 ZOSTER VACCINE AGE 60> 8/29/2002 GLAUCOMA SCREENING Q2Y 10/29/2007 MEDICARE YEARLY EXAM 10/29/2007 Pneumococcal 65+ Low/Medium Risk (2 of 2 - PCV13) 4/16/2015 INFLUENZA AGE 9 TO ADULT 8/1/2017 Allergies as of 10/17/2017  Review Complete On: 10/17/2017 By: Lakesha Lebron RN Severity Noted Reaction Type Reactions Oxybutynin Chloride Medium 05/13/2016   Systemic Swelling, Contact Dermatitis Minocycline  09/10/2012    Itching Nsaids (Non-steroidal Anti-inflammatory Drug)  09/10/2012    Other (comments) Mess stomach up Oragrafin  09/10/2012    Rash, Swelling Other Medication  03/01/2017    Other (comments) \"chloride tabs\"- \"critical\" Sulfa (Sulfonamide Antibiotics)  09/10/2012    Hives, Swelling Current Immunizations  Reviewed on 4/17/2014 Name Date Influenza Vaccine 10/1/2013 Pneumococcal Polysaccharide (PPSV-23) 4/16/2014  6:45 AM,  Deferred (Patient Refused) Not reviewed this visit You Were Diagnosed With   
  
 Codes Comments Peripheral arterial disease ([facility]    -  Primary ICD-10-CM: I73.9 ICD-9-CM: 443. 9 Vitals BP Pulse Resp Height(growth percentile) Weight(growth percentile) BMI  
 118/60 (BP 1 Location: Left arm, BP Patient Position: Sitting) 68 18 5' (1.524 m) 130 lb (59 kg) 25.39 kg/m2 OB Status Smoking Status Hysterectomy Former Smoker BMI and BSA Data Body Mass Index Body Surface Area  
 25.39 kg/m 2 1.58 m 2 Preferred Pharmacy Pharmacy Name Phone [id number] [address] [place] [address]. [phone] Your Updated Medication List  
  
   
This list is accurate as of: 10/17/17 10:05 AM.  Always use your most recent med list.  
  
  
  
  
 albuterol 90 mcg/actuation inhaler Commonly known as:  PROVENTIL HFA, VENTOLIN HFA, PROAIR HFA Take 1 Puff by inhalation every four (4) hours as needed. Indications: CHRONIC OBSTRUCTIVE PULMONARY DISEASE ALPHAGAN P 0.1 % ophthalmic solution Generic drug:  brimonidine Administer  to both eyes two (2) times a day. Indications: glaucoma  
  
 amLODIPine 5 mg tablet Commonly known as:  Izetta Harder Take 7.5 mg by mouth daily. aspirin 81 mg chewable tablet Take 1 Tab by mouth daily. BROVANA 15 mcg/2 mL Nebu neb solution Generic drug:  arformoterol 15 mcg by Nebulization route two (2) times a day. Pt instructed to take am of surgery. Indications: COPD ASSOCIATED WITH CHRONIC BRONCHITIS buPROPion 100 mg tablet Commonly known as:  [facility] Take 200 mg by mouth two (2) times a day. Indications: MAJOR DEPRESSIVE DISORDER  
  
 calcium 500 mg Tab Take 2 Tabs by mouth daily. carvedilol 6.25 mg tablet Commonly known as:  Layman Fields Take 6.25 mg by mouth two (2) times daily (with meals). cholecalciferol (vitamin D3) 2,000 unit Tab Commonly known as:  VITAMIN D3 Take 5,000 mg by mouth daily. DULoxetine 60 mg capsule Commonly known as:  CYMBALTA Take 60 mg by mouth daily. escitalopram oxalate 10 mg tablet Commonly known as:  Raffaele Rhodesll Take 10 mg by mouth daily. ESTRACE 0.01 % (0.1 mg/gram) vaginal cream  
Generic drug:  estradiol Insert 2 g into vagina daily. ferrous sulfate 325 mg (65 mg iron) tablet Take  by mouth Daily (before breakfast). Only takes 4 times per week. FISH  mg Cap Generic drug:  Omega-3 Fatty Acids Take 300 mg by mouth. FML LIQUIFILM 0.1 % ophthalmic suspension Generic drug:  fluorometholone Administer 1 Drop to both eyes two (2) times a day. Indications: Dry eyes  
  
 indapamide 2.5 mg tablet Commonly known as:  Fernando Askew Take  by mouth daily. levothyroxine 88 mcg tablet Commonly known as:  SYNTHROID Take 88 mcg by mouth Daily (before breakfast). Pt instructed to take am of surgery. Indications: HYPOTHYROIDISM  
  
 magnesium chloride 64 mg tablet Commonly known as:  MAG DELAY Take 1 Tab by mouth daily. MYRBETRIQ 50 mg ER tablet Generic drug:  mirabegron ER Take 50 mg by mouth daily. NEURONTIN 300 mg capsule Generic drug:  gabapentin Take 300 mg by mouth nightly. nicotinic acid 500 mg tablet Commonly known as:  NIACIN Take 1 Tab by mouth Daily (before breakfast). Omeprazole delayed release 20 mg tablet Commonly known as:  PRILOSEC D/R Take 20 mg by mouth daily. OTHER  
HOME OXYGEN  
  
 PLAVIX 75 mg Tab Generic drug:  clopidogrel Take 75 mg by mouth daily. Indications: Cardiac stent  
  
 pravastatin 40 mg tablet Commonly known as:  PRAVACHOL Take 80 mg by mouth nightly. Indications: HYPERCHOLESTEROLEMIA  
  
 ramipril 10 mg capsule Commonly known as:  ALTACE Take 10 mg by mouth daily. Pt instructed to take am of surgery. Indications: HYPERTENSION  
  
 RESTASIS 0.05 % ophthalmic emulsion Generic drug:  cycloSPORINE Administer 1 Drop to both eyes two (2) times a day. Indications: DRY EYE  
  
 tolterodine ER 4 mg ER capsule Commonly known as:  Denyce Neva Take 4 mg by mouth daily. VALIUM 10 mg tablet Generic drug:  diazePAM  
Take 10 mg by mouth every eight (8) hours as needed. Indications: ANXIETY To-Do List   
 01/17/2018 Imaging:  LOWER EXT ART PVR MULT LEVEL SEG PRESSURES   
  
 01/24/2018 Imaging:  DUPLEX LOWER EXT BYPASS GRAFT RIGHT Please provide this summary of care documentation to your next provider. Your primary care clinician is listed as Meena Denny. If you have any questions after today's visit, please call [phone].
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
## 2018-01-23 ENCOUNTER — HOSPITAL ENCOUNTER (OUTPATIENT)
Dept: VASCULAR SURGERY | Age: 78
Discharge: HOME | End: 2018-01-23
Attending: SURGERY
Payer: MEDICARE

## 2018-01-23 DIAGNOSIS — I73.9 PERIPHERAL ARTERIAL DISEASE (HCC): ICD-10-CM

## 2018-01-23 PROCEDURE — 93923 UPR/LXTR ART STDY 3+ LVLS: CPT

## 2018-01-23 PROCEDURE — 93926 LOWER EXTREMITY STUDY: CPT

## 2018-01-23 NOTE — PROCEDURES
[facility]  *** FINAL REPORT ***    Name: Silvio Vital  MRN: [id number]    Outpatient  : 29 Oct 1940  HIS Order #: [id number]  [address] [facility] Visit #: [id number]  Date: 2018    TYPE OF TEST: Bypass Graft Duplex    REASON FOR TEST  Claudication, Surveillance    Graft:-  Summary:   Revision of Right common femoral - popliteal (below knee)  bypass  Op. Date:  2016  Surgeon: Ken Gonsalves    Results:-            Velocity  Ratio  Stenosis         Waveform            --------  -----  --------         ----------  Inflow:    289.0  Proximal:  224.0      0.8  Normal  Upper:     103.0      0.5  Normal  Mid-graft:  86.0      0.8  Normal  Lower:      75.0      0.9  Normal  Distal:     86.0      1.1  Minimal  Outflow:    65.0      0.8  Normal    ALEX:    INTERPRETATION/FINDINGS  Duplex images were obtained using 2-D gray scale, color flow, and  spectral Doppler analysis. Duplex exam of the bypass graft reveals :  1. No evidence of significant stenosis in the right common femoral to  below knee popliteal artery bypass graft. 2. Posterior tibial and peroneal and anterior tibial arteries are  patent, however the vessels are very small in caliber and posterior  tibial is not seen too well due to the reason mention above. ADDITIONAL COMMENTS    I have personally reviewed the data relevant to the interpretation of  this  study. TECHNOLOGIST: Last Tierney  Signed: 2018 04:02 PM    PHYSICIAN: Mnoae Escobar.  Scar Davis MD  Signed: 2018 11:30 AM

## 2018-01-23 NOTE — PROCEDURES
[facility]  *** FINAL REPORT ***    Name: Melissa Nicole  MRN: [id number]    Outpatient  : 29 Oct 1940  HIS Order #: [id number]  [id number] [facility] Visit #: [id number]  Date: 2018    TYPE OF TEST: Peripheral Arterial Testing    REASON FOR TEST  Claudication    Right Leg  Doppler:    Abnormal  Ankle/Brachial: 0.93    Left Leg  Doppler:    Abnormal  Ankle/Brachial: 0.63    Site of occlusive disease:-  iliac (or above) and femoral segments    INTERPRETATION/FINDINGS  Physiologic testing was performed using continuous wave Doppler and  segmental pressures. Right le. Mild peripheral arterial disease indicated at rest in the right  leg. 2. The right ankle/brachial index is 0.93  3. The toe pressure is 81mmHg    Left le. Moderate peripheral arterial disease indicated at rest in the left  leg. 2. Disease is located in the iliofemoral segment on the left side. 3. The left ankle/brachial index is 0.63. 4. The left toe pressure is 75mmHg    ADDITIONAL COMMENTS    I have personally reviewed the data relevant to the interpretation of  this  study. TECHNOLOGIST: Moise Conroy  Signed: 2018 03:23 PM    PHYSICIAN: Sharda Lyon.  Lala Mercado MD  Signed: 2018 11:28 AM

## 2018-01-31 ENCOUNTER — OFFICE VISIT (OUTPATIENT)
Dept: VASCULAR SURGERY | Age: 78
End: 2018-01-31

## 2018-01-31 VITALS
HEIGHT: 60 IN | HEART RATE: 68 BPM | SYSTOLIC BLOOD PRESSURE: 118 MMHG | RESPIRATION RATE: 18 BRPM | BODY MASS INDEX: 25.52 KG/M2 | DIASTOLIC BLOOD PRESSURE: 62 MMHG | WEIGHT: 130 LBS

## 2018-01-31 DIAGNOSIS — M79.604 LEG PAIN, BILATERAL: Primary | ICD-10-CM

## 2018-01-31 DIAGNOSIS — I77.811 AORTIC ECTASIA, ABDOMINAL (HCC): ICD-10-CM

## 2018-01-31 DIAGNOSIS — I73.9 PERIPHERAL ARTERIAL DISEASE (HCC): ICD-10-CM

## 2018-01-31 DIAGNOSIS — M79.605 LEG PAIN, BILATERAL: Primary | ICD-10-CM

## 2018-01-31 NOTE — MR AVS SNAPSHOT
[facility] 
 
 
 [facility] [address] [facility] 
[phone] Patient: Rubén Horton MRN: [id number] PZW:[id number] Visit Information Date & Time Provider Department Dept. Phone Encounter #  
 1/31/2018  2:30 PM Tammy Olmstead NP [facility] [address] [id number] Your Appointments 7/25/2018  9:45 AM  
Follow Up with Zoë Bradley MD  
[facility] ([address]) Appt Note: 6 month FU with studies [address]  
  
   
 [facility] [address] Upcoming Health Maintenance Date Due DTaP/Tdap/Td series (1 - Tdap) 10/29/1963 ZOSTER VACCINE AGE 60> 8/29/2002 GLAUCOMA SCREENING Q2Y 10/29/2007 MEDICARE YEARLY EXAM 10/29/2007 Pneumococcal 65+ Low/Medium Risk (2 of 2 - PCV13) 4/16/2015 Influenza Age 5 to Adult 8/1/2017 Allergies as of 1/31/2018  Review Complete On: 1/31/2018 By: Renea Clinton RN Severity Noted Reaction Type Reactions Oxybutynin Chloride Medium 05/13/2016   Systemic Swelling, Contact Dermatitis Minocycline  09/10/2012    Itching Nsaids (Non-steroidal Anti-inflammatory Drug)  09/10/2012    Other (comments) Mess stomach up Oragrafin  09/10/2012    Rash, Swelling Other Medication  03/01/2017    Other (comments) \"chloride tabs\"- \"critical\" Sulfa (Sulfonamide Antibiotics)  09/10/2012    Hives, Swelling Current Immunizations  Reviewed on 4/17/2014 Name Date Influenza Vaccine 10/1/2013 Pneumococcal Polysaccharide (PPSV-23) 4/16/2014  6:45 AM,  Deferred (Patient Refused) Not reviewed this visit Vitals BP Pulse Resp Height(growth percentile) Weight(growth percentile) BMI  
 118/62 (BP 1 Location: Right arm, BP Patient Position: Sitting) 68 18 5' (1.524 m) 130 lb (59 kg) 25.39 kg/m2 OB Status Smoking Status Hysterectomy Former Smoker BMI and BSA Data Body Mass Index Body Surface Area  
 25.39 kg/m 2 1.58 m 2 Preferred Pharmacy Pharmacy Name Phone [id number] [address] [place] [address]. [phone] Your Updated Medication List  
  
   
This list is accurate as of: 1/31/18  3:05 PM.  Always use your most recent med list.  
  
  
  
  
 albuterol 90 mcg/actuation inhaler Commonly known as:  PROVENTIL HFA, VENTOLIN HFA, PROAIR HFA Take 1 Puff by inhalation every four (4) hours as needed. Indications: CHRONIC OBSTRUCTIVE PULMONARY DISEASE ALPHAGAN P 0.1 % ophthalmic solution Generic drug:  brimonidine Administer  to both eyes two (2) times a day. Indications: glaucoma  
  
 amLODIPine 5 mg tablet Commonly known as:  Christal Pique Take 7.5 mg by mouth daily. aspirin 81 mg chewable tablet Take 1 Tab by mouth daily. BROVANA 15 mcg/2 mL Nebu neb solution Generic drug:  arformoterol 15 mcg by Nebulization route two (2) times a day. Pt instructed to take am of surgery. Indications: COPD ASSOCIATED WITH CHRONIC BRONCHITIS buPROPion 100 mg tablet Commonly known as:  [facility] Take 200 mg by mouth two (2) times a day. Indications: MAJOR DEPRESSIVE DISORDER  
  
 calcium 500 mg Tab Take 2 Tabs by mouth daily. carvedilol 6.25 mg tablet Commonly known as:  Nai Rings Take 6.25 mg by mouth two (2) times daily (with meals). cholecalciferol (vitamin D3) 2,000 unit Tab Commonly known as:  VITAMIN D3 Take 5,000 mg by mouth daily. DULoxetine 60 mg capsule Commonly known as:  CYMBALTA Take 60 mg by mouth daily. escitalopram oxalate 10 mg tablet Commonly known as:  Veola Fujita Take 10 mg by mouth daily. ESTRACE 0.01 % (0.1 mg/gram) vaginal cream  
Generic drug:  estradiol Insert 2 g into vagina daily. ferrous sulfate 325 mg (65 mg iron) tablet Take  by mouth Daily (before breakfast). Only takes 4 times per week. FISH  mg Cap Generic drug:  Omega-3 Fatty Acids Take 300 mg by mouth. FML LIQUIFILM 0.1 % ophthalmic suspension Generic drug:  fluorometholone Administer 1 Drop to both eyes two (2) times a day. Indications: Dry eyes  
  
 indapamide 2.5 mg tablet Commonly known as:  Kiera Benton Take  by mouth daily. levothyroxine 88 mcg tablet Commonly known as:  SYNTHROID Take 88 mcg by mouth Daily (before breakfast). Pt instructed to take am of surgery. Indications: HYPOTHYROIDISM  
  
 magnesium chloride 64 mg tablet Commonly known as:  MAG DELAY Take 1 Tab by mouth daily. MYRBETRIQ 50 mg ER tablet Generic drug:  mirabegron ER Take 50 mg by mouth daily. NEURONTIN 300 mg capsule Generic drug:  gabapentin Take 300 mg by mouth nightly. nicotinic acid 500 mg tablet Commonly known as:  NIACIN Take 1 Tab by mouth Daily (before breakfast). Omeprazole delayed release 20 mg tablet Commonly known as:  PRILOSEC D/R Take 20 mg by mouth daily. OTHER  
HOME OXYGEN  
  
 PLAVIX 75 mg Tab Generic drug:  clopidogrel Take 75 mg by mouth daily. Indications: Cardiac stent  
  
 pravastatin 40 mg tablet Commonly known as:  PRAVACHOL Take 80 mg by mouth nightly. Indications: HYPERCHOLESTEROLEMIA  
  
 ramipril 10 mg capsule Commonly known as:  ALTACE Take 10 mg by mouth daily. Pt instructed to take am of surgery. Indications: HYPERTENSION  
  
 RESTASIS 0.05 % ophthalmic emulsion Generic drug:  cycloSPORINE Administer 1 Drop to both eyes two (2) times a day. Indications: DRY EYE  
  
 tolterodine ER 4 mg ER capsule Commonly known as:  Rosalia Shoe Take 4 mg by mouth daily. VALIUM 10 mg tablet Generic drug:  diazePAM  
Take 10 mg by mouth every eight (8) hours as needed. Indications: ANXIETY Please provide this summary of care documentation to your next provider. Your primary care clinician is listed as Meena Denny. If you have any questions after today's visit, please call [phone].
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
## 2018-02-16 ENCOUNTER — HOSPITAL ENCOUNTER (OUTPATIENT)
Dept: MRI IMAGING | Age: 78
Discharge: HOME | End: 2018-02-16
Attending: ORTHOPAEDIC SURGERY
Payer: MEDICARE

## 2018-02-16 DIAGNOSIS — M25.551 RIGHT HIP PAIN: ICD-10-CM

## 2018-02-16 PROCEDURE — 73721 MRI JNT OF LWR EXTRE W/O DYE: CPT

## 2018-07-11 ENCOUNTER — HOSPITAL ENCOUNTER (OUTPATIENT)
Dept: VASCULAR SURGERY | Age: 78
Discharge: HOME | End: 2018-07-11
Attending: NURSE PRACTITIONER
Payer: MEDICARE

## 2018-07-11 ENCOUNTER — HOSPITAL ENCOUNTER (OUTPATIENT)
Dept: CT IMAGING | Age: 78
Discharge: HOME | End: 2018-07-11
Attending: NURSE PRACTITIONER
Payer: MEDICARE

## 2018-07-11 DIAGNOSIS — I77.811 AORTIC ECTASIA, ABDOMINAL (HCC): ICD-10-CM

## 2018-07-11 DIAGNOSIS — I73.9 PERIPHERAL ARTERIAL DISEASE (HCC): ICD-10-CM

## 2018-07-11 LAB
CREAT UR-MCNC: 1 MG/DL (ref 0.6–1.3)
LEFT ABI: 0.66
LEFT ANTERIOR TIBIAL: 118 MMHG
LEFT ARM BP: 193 MMHG
LEFT POSTERIOR TIBIAL: 127 MMHG
LEFT TBI: 0.64
LEFT TOE PRESSURE: 123 MMHG
RIGHT ABI: 0.93
RIGHT ANTERIOR TIBIAL: 178 MMHG
RIGHT ARM BP: 191 MMHG
RIGHT ARTERIAL PROX ANASTOMOSIS PSV: 114.2 CM/S
RIGHT DIST OUTFLOW PSV: 63.3 CM/S
RIGHT INFLOW ARTERY PSV: 153.8 CM/S
RIGHT MID OUTFLOW PSV: 86.6 CM/S
RIGHT OUTFLOW VESSEL PSV: 77.5 CM/S
RIGHT POSTERIOR TIBIAL: 179 MMHG
RIGHT PROX OUTFLOW PSV: 78 CM/S
RIGHT TBI: 0.82
RIGHT TOE PRESSURE: 158 MMHG
RIGHT VENOUS DIST ANASTOMOSIS PSV: 62.5 CM/S

## 2018-07-11 PROCEDURE — 74174 CTA ABD&PLVS W/CONTRAST: CPT

## 2018-07-11 PROCEDURE — 93926 LOWER EXTREMITY STUDY: CPT

## 2018-07-11 PROCEDURE — 82565 ASSAY OF CREATININE: CPT

## 2018-07-11 PROCEDURE — 74011636320 HC RX REV CODE- 636/320: Performed by: NURSE PRACTITIONER

## 2018-07-11 PROCEDURE — 93923 UPR/LXTR ART STDY 3+ LVLS: CPT

## 2018-07-11 RX ADMIN — IOPAMIDOL 100 ML: 755 INJECTION, SOLUTION INTRAVENOUS at 08:38

## 2018-07-18 ENCOUNTER — HOSPITAL ENCOUNTER (OUTPATIENT)
Dept: MRI IMAGING | Age: 78
Discharge: HOME | End: 2018-07-18
Attending: ORTHOPAEDIC SURGERY
Payer: MEDICARE

## 2018-07-18 DIAGNOSIS — M25.552 LEFT HIP PAIN: ICD-10-CM

## 2018-07-18 DIAGNOSIS — M25.551 RIGHT HIP PAIN: ICD-10-CM

## 2018-07-18 PROCEDURE — 73721 MRI JNT OF LWR EXTRE W/O DYE: CPT

## 2018-07-27 ENCOUNTER — HOSPITAL ENCOUNTER (OUTPATIENT)
Dept: PREADMISSION TESTING | Age: 78
Discharge: HOME | End: 2018-07-27
Payer: MEDICARE

## 2018-07-27 ENCOUNTER — HOSPITAL ENCOUNTER (OUTPATIENT)
Dept: GENERAL RADIOLOGY | Age: 78
Discharge: HOME | End: 2018-07-27
Payer: MEDICARE

## 2018-07-27 DIAGNOSIS — Z01.818 PREOP EXAMINATION: ICD-10-CM

## 2018-07-27 LAB
ALBUMIN SERPL-MCNC: 3.9 G/DL (ref 3.4–5)
ALBUMIN/GLOB SERPL: 1.1 {RATIO} (ref 0.8–1.7)
ALP SERPL-CCNC: 103 U/L (ref 45–117)
ALT SERPL-CCNC: 20 U/L (ref 13–56)
ANION GAP SERPL CALC-SCNC: 8 MMOL/L (ref 3–18)
APPEARANCE UR: CLEAR
AST SERPL-CCNC: 18 U/L (ref 15–37)
BACTERIA URNS QL MICRO: ABNORMAL /HPF
BILIRUB SERPL-MCNC: 0.8 MG/DL (ref 0.2–1)
BILIRUB UR QL: NEGATIVE
BUN SERPL-MCNC: 34 MG/DL (ref 7–18)
BUN/CREAT SERPL: 25 (ref 12–20)
CALCIUM SERPL-MCNC: 10.1 MG/DL (ref 8.5–10.1)
CHLORIDE SERPL-SCNC: 102 MMOL/L (ref 100–108)
CO2 SERPL-SCNC: 28 MMOL/L (ref 21–32)
COLOR UR: ABNORMAL
CREAT SERPL-MCNC: 1.37 MG/DL (ref 0.6–1.3)
EPITH CASTS URNS QL MICRO: ABNORMAL /LPF (ref 0–5)
ERYTHROCYTE [DISTWIDTH] IN BLOOD BY AUTOMATED COUNT: 12.9 % (ref 11.6–14.5)
GLOBULIN SER CALC-MCNC: 3.7 G/DL (ref 2–4)
GLUCOSE SERPL-MCNC: 72 MG/DL (ref 74–99)
GLUCOSE UR STRIP.AUTO-MCNC: NEGATIVE MG/DL
HCT VFR BLD AUTO: 35.3 % (ref 35–45)
HGB BLD-MCNC: 11.7 G/DL (ref 12–16)
HGB UR QL STRIP: NEGATIVE
KETONES UR QL STRIP.AUTO: NEGATIVE MG/DL
LEUKOCYTE ESTERASE UR QL STRIP.AUTO: ABNORMAL
MCH RBC QN AUTO: 29.5 PG (ref 24–34)
MCHC RBC AUTO-ENTMCNC: 33.1 G/DL (ref 31–37)
MCV RBC AUTO: 88.9 FL (ref 74–97)
NITRITE UR QL STRIP.AUTO: NEGATIVE
PH UR STRIP: 5 [PH] (ref 5–8)
PLATELET # BLD AUTO: 302 K/UL (ref 135–420)
PMV BLD AUTO: 9.7 FL (ref 9.2–11.8)
POTASSIUM SERPL-SCNC: 3.4 MMOL/L (ref 3.5–5.5)
PROT SERPL-MCNC: 7.6 G/DL (ref 6.4–8.2)
PROT UR STRIP-MCNC: NEGATIVE MG/DL
RBC # BLD AUTO: 3.97 M/UL (ref 4.2–5.3)
RBC #/AREA URNS HPF: NEGATIVE /HPF (ref 0–5)
SODIUM SERPL-SCNC: 138 MMOL/L (ref 136–145)
SP GR UR REFRACTOMETRY: 1.02 (ref 1–1.03)
UROBILINOGEN UR QL STRIP.AUTO: 1 EU/DL (ref 0.2–1)
WBC # BLD AUTO: 5.7 K/UL (ref 4.6–13.2)
WBC URNS QL MICRO: ABNORMAL /HPF (ref 0–5)

## 2018-07-27 PROCEDURE — 93005 ELECTROCARDIOGRAM TRACING: CPT

## 2018-07-27 PROCEDURE — 87086 URINE CULTURE/COLONY COUNT: CPT | Performed by: ORTHOPAEDIC SURGERY

## 2018-07-27 PROCEDURE — 36415 COLL VENOUS BLD VENIPUNCTURE: CPT | Performed by: ORTHOPAEDIC SURGERY

## 2018-07-27 PROCEDURE — 71045 X-RAY EXAM CHEST 1 VIEW: CPT

## 2018-07-27 PROCEDURE — 85027 COMPLETE CBC AUTOMATED: CPT | Performed by: ORTHOPAEDIC SURGERY

## 2018-07-27 PROCEDURE — 81001 URINALYSIS AUTO W/SCOPE: CPT | Performed by: ORTHOPAEDIC SURGERY

## 2018-07-27 PROCEDURE — 80053 COMPREHEN METABOLIC PANEL: CPT | Performed by: ORTHOPAEDIC SURGERY

## 2018-07-27 PROCEDURE — 87641 MR-STAPH DNA AMP PROBE: CPT | Performed by: ORTHOPAEDIC SURGERY

## 2018-07-28 LAB
BACTERIA SPEC CULT: NORMAL
SERVICE CMNT-IMP: NORMAL

## 2018-07-29 LAB
ATRIAL RATE: 58 BPM
BACTERIA SPEC CULT: NORMAL
CALCULATED P AXIS, ECG09: 64 DEGREES
CALCULATED R AXIS, ECG10: -49 DEGREES
CALCULATED T AXIS, ECG11: 94 DEGREES
DIAGNOSIS, 93000: NORMAL
P-R INTERVAL, ECG05: 182 MS
Q-T INTERVAL, ECG07: 446 MS
QRS DURATION, ECG06: 96 MS
QTC CALCULATION (BEZET), ECG08: 437 MS
SERVICE CMNT-IMP: NORMAL
VENTRICULAR RATE, ECG03: 58 BPM

## 2018-08-02 ENCOUNTER — ANESTHESIA EVENT (OUTPATIENT)
Dept: SURGERY | Age: 78
DRG: 470 | End: 2018-08-02
Payer: MEDICARE

## 2018-08-03 ENCOUNTER — ANESTHESIA (OUTPATIENT)
Dept: SURGERY | Age: 78
DRG: 470 | End: 2018-08-03
Payer: MEDICARE

## 2018-08-03 ENCOUNTER — APPOINTMENT (OUTPATIENT)
Dept: GENERAL RADIOLOGY | Age: 78
DRG: 470 | End: 2018-08-03
Attending: ORTHOPAEDIC SURGERY
Payer: MEDICARE

## 2018-08-03 ENCOUNTER — HOSPITAL ENCOUNTER (INPATIENT)
Age: 78
LOS: 3 days | Discharge: NURSING, REHAB OR LONG-TERM CARE | DRG: 470 | End: 2018-08-06
Attending: ORTHOPAEDIC SURGERY | Admitting: ORTHOPAEDIC SURGERY
Payer: MEDICARE

## 2018-08-03 DIAGNOSIS — I77.811 AORTIC ECTASIA, ABDOMINAL (HCC): ICD-10-CM

## 2018-08-03 DIAGNOSIS — N39.3 SUI (STRESS URINARY INCONTINENCE, FEMALE): ICD-10-CM

## 2018-08-03 DIAGNOSIS — M79.605 LEG PAIN, BILATERAL: ICD-10-CM

## 2018-08-03 DIAGNOSIS — R79.89 AZOTEMIA: ICD-10-CM

## 2018-08-03 DIAGNOSIS — I73.9 PERIPHERAL ARTERIAL DISEASE (HCC): ICD-10-CM

## 2018-08-03 DIAGNOSIS — R42 DIZZINESS: ICD-10-CM

## 2018-08-03 DIAGNOSIS — N36.42 INTRINSIC SPHINCTER DEFICIENCY (ISD): ICD-10-CM

## 2018-08-03 DIAGNOSIS — M79.604 LEG PAIN, BILATERAL: ICD-10-CM

## 2018-08-03 DIAGNOSIS — M16.0 PRIMARY OSTEOARTHRITIS OF BOTH HIPS: Primary | ICD-10-CM

## 2018-08-03 PROBLEM — M16.9 DEGENERATIVE JOINT DISEASE (DJD) OF HIP: Status: ACTIVE | Noted: 2018-08-03

## 2018-08-03 LAB — POTASSIUM SERPL-SCNC: 3.9 MMOL/L (ref 3.5–5.5)

## 2018-08-03 PROCEDURE — 77030027138 HC INCENT SPIROMETER -A: Performed by: ORTHOPAEDIC SURGERY

## 2018-08-03 PROCEDURE — 86920 COMPATIBILITY TEST SPIN: CPT | Performed by: ORTHOPAEDIC SURGERY

## 2018-08-03 PROCEDURE — 36415 COLL VENOUS BLD VENIPUNCTURE: CPT | Performed by: ORTHOPAEDIC SURGERY

## 2018-08-03 PROCEDURE — 74011250637 HC RX REV CODE- 250/637: Performed by: ORTHOPAEDIC SURGERY

## 2018-08-03 PROCEDURE — 74011250636 HC RX REV CODE- 250/636

## 2018-08-03 PROCEDURE — 77030003028 HC SUT VCRL J&J -A: Performed by: ORTHOPAEDIC SURGERY

## 2018-08-03 PROCEDURE — 76060000034 HC ANESTHESIA 1.5 TO 2 HR: Performed by: ORTHOPAEDIC SURGERY

## 2018-08-03 PROCEDURE — 77030016154: Performed by: ORTHOPAEDIC SURGERY

## 2018-08-03 PROCEDURE — C9290 INJ, BUPIVACAINE LIPOSOME: HCPCS | Performed by: ORTHOPAEDIC SURGERY

## 2018-08-03 PROCEDURE — 77030022295 HC SEAL BPLR VSL DISP MEDT -F: Performed by: ORTHOPAEDIC SURGERY

## 2018-08-03 PROCEDURE — 77030018846 HC SOL IRR STRL H20 ICUM -A: Performed by: ORTHOPAEDIC SURGERY

## 2018-08-03 PROCEDURE — 74011250637 HC RX REV CODE- 250/637: Performed by: ANESTHESIOLOGY

## 2018-08-03 PROCEDURE — 74011250636 HC RX REV CODE- 250/636: Performed by: ORTHOPAEDIC SURGERY

## 2018-08-03 PROCEDURE — 94760 N-INVAS EAR/PLS OXIMETRY 1: CPT

## 2018-08-03 PROCEDURE — 77030039267 HC ADH SKN EXOFIN S2SG -B: Performed by: ORTHOPAEDIC SURGERY

## 2018-08-03 PROCEDURE — 77030038010: Performed by: ORTHOPAEDIC SURGERY

## 2018-08-03 PROCEDURE — 74011000258 HC RX REV CODE- 258: Performed by: ANESTHESIOLOGY

## 2018-08-03 PROCEDURE — 77030006643: Performed by: ANESTHESIOLOGY

## 2018-08-03 PROCEDURE — 77030020262 HC SOL INJ SOD CL 0.9% 100ML: Performed by: ORTHOPAEDIC SURGERY

## 2018-08-03 PROCEDURE — 94640 AIRWAY INHALATION TREATMENT: CPT

## 2018-08-03 PROCEDURE — 8E0YXBF COMPUTER ASSISTED PROCEDURE OF LOWER EXTREMITY, WITH FLUOROSCOPY: ICD-10-PCS | Performed by: ORTHOPAEDIC SURGERY

## 2018-08-03 PROCEDURE — 76210000016 HC OR PH I REC 1 TO 1.5 HR: Performed by: ORTHOPAEDIC SURGERY

## 2018-08-03 PROCEDURE — 77030018836 HC SOL IRR NACL ICUM -A: Performed by: ORTHOPAEDIC SURGERY

## 2018-08-03 PROCEDURE — 77030005521 HC CATH URETH FOL38 BARD -B: Performed by: ORTHOPAEDIC SURGERY

## 2018-08-03 PROCEDURE — 77030013708 HC HNDPC SUC IRR PULS STRY –B: Performed by: ORTHOPAEDIC SURGERY

## 2018-08-03 PROCEDURE — 76010000153 HC OR TIME 1.5 TO 2 HR: Performed by: ORTHOPAEDIC SURGERY

## 2018-08-03 PROCEDURE — 74011000258 HC RX REV CODE- 258: Performed by: ORTHOPAEDIC SURGERY

## 2018-08-03 PROCEDURE — 74011000258 HC RX REV CODE- 258

## 2018-08-03 PROCEDURE — 77030037875 HC DRSG MEPILEX <16IN BORD MOLN -A: Performed by: ORTHOPAEDIC SURGERY

## 2018-08-03 PROCEDURE — 74011000250 HC RX REV CODE- 250

## 2018-08-03 PROCEDURE — 77030011640 HC PAD GRND REM COVD -A: Performed by: ORTHOPAEDIC SURGERY

## 2018-08-03 PROCEDURE — 84132 ASSAY OF SERUM POTASSIUM: CPT | Performed by: ANESTHESIOLOGY

## 2018-08-03 PROCEDURE — 77010033678 HC OXYGEN DAILY

## 2018-08-03 PROCEDURE — 65270000029 HC RM PRIVATE

## 2018-08-03 PROCEDURE — 77030038020 HC MANFLD NEPTUNE STRY -B: Performed by: ORTHOPAEDIC SURGERY

## 2018-08-03 PROCEDURE — 77030020782 HC GWN BAIR PAWS FLX 3M -B: Performed by: ORTHOPAEDIC SURGERY

## 2018-08-03 PROCEDURE — 97161 PT EVAL LOW COMPLEX 20 MIN: CPT

## 2018-08-03 PROCEDURE — 77030008477 HC STYL SATN SLP COVD -A: Performed by: ANESTHESIOLOGY

## 2018-08-03 PROCEDURE — 77030020255 HC SOL INJ LR 1000ML BG: Performed by: ORTHOPAEDIC SURGERY

## 2018-08-03 PROCEDURE — 0SR904Z REPLACEMENT OF RIGHT HIP JOINT WITH CERAMIC ON POLYETHYLENE SYNTHETIC SUBSTITUTE, OPEN APPROACH: ICD-10-PCS | Performed by: ORTHOPAEDIC SURGERY

## 2018-08-03 PROCEDURE — 97116 GAIT TRAINING THERAPY: CPT

## 2018-08-03 PROCEDURE — 77030027138 HC INCENT SPIROMETER -A

## 2018-08-03 PROCEDURE — 74011258636 HC RX REV CODE- 258/636: Performed by: ORTHOPAEDIC SURGERY

## 2018-08-03 PROCEDURE — C1776 JOINT DEVICE (IMPLANTABLE): HCPCS | Performed by: ORTHOPAEDIC SURGERY

## 2018-08-03 PROCEDURE — 77030008683 HC TU ET CUF COVD -A: Performed by: ANESTHESIOLOGY

## 2018-08-03 PROCEDURE — 77030032490 HC SLV COMPR SCD KNE COVD -B: Performed by: ORTHOPAEDIC SURGERY

## 2018-08-03 PROCEDURE — 74011000250 HC RX REV CODE- 250: Performed by: ORTHOPAEDIC SURGERY

## 2018-08-03 PROCEDURE — 86900 BLOOD TYPING SEROLOGIC ABO: CPT | Performed by: ORTHOPAEDIC SURGERY

## 2018-08-03 PROCEDURE — 77030031139 HC SUT VCRL2 J&J -A: Performed by: ORTHOPAEDIC SURGERY

## 2018-08-03 DEVICE — CUP ACET SECTOR GRIPTION 48MM -- TI: Type: IMPLANTABLE DEVICE | Site: HIP | Status: FUNCTIONAL

## 2018-08-03 DEVICE — LINER ACET OD48MM ID32MM NEUT OFFSET HIP ALTRX PINN: Type: IMPLANTABLE DEVICE | Site: HIP | Status: FUNCTIONAL

## 2018-08-03 DEVICE — STEM FEM SZ 2 STD OFFSET HIP CLLRD ARTC EZ 12/14 TAPR ACTIS: Type: IMPLANTABLE DEVICE | Site: HIP | Status: FUNCTIONAL

## 2018-08-03 DEVICE — ELIMINATOR H APEX FOR 48-60MM PINN HIP SHELL: Type: IMPLANTABLE DEVICE | Site: HIP | Status: FUNCTIONAL

## 2018-08-03 DEVICE — COMPONENT TOT HIP PRIMARY CERM ALTRX: Type: IMPLANTABLE DEVICE | Site: HIP | Status: FUNCTIONAL

## 2018-08-03 DEVICE — SCREW BNE L20MM DIA6.5MM CANC HIP S STL GRIPTION FULL THRD: Type: IMPLANTABLE DEVICE | Site: HIP | Status: FUNCTIONAL

## 2018-08-03 DEVICE — HEAD FEM DIA32MM +5MM OFFSET 12/14 TAPR HIP CERAMIC BIOLOX: Type: IMPLANTABLE DEVICE | Site: HIP | Status: FUNCTIONAL

## 2018-08-03 RX ORDER — BUDESONIDE 0.5 MG/2ML
500 INHALANT ORAL 2 TIMES DAILY
Status: DISCONTINUED | OUTPATIENT
Start: 2018-08-03 | End: 2018-08-03

## 2018-08-03 RX ORDER — HYDROMORPHONE HYDROCHLORIDE 2 MG/ML
0.5 INJECTION, SOLUTION INTRAMUSCULAR; INTRAVENOUS; SUBCUTANEOUS
Status: DISCONTINUED | OUTPATIENT
Start: 2018-08-03 | End: 2018-08-03 | Stop reason: HOSPADM

## 2018-08-03 RX ORDER — CYCLOSPORINE 0.5 MG/ML
1 EMULSION OPHTHALMIC 2 TIMES DAILY
Status: DISCONTINUED | OUTPATIENT
Start: 2018-08-03 | End: 2018-08-03 | Stop reason: SDUPTHER

## 2018-08-03 RX ORDER — LEVOTHYROXINE SODIUM 88 UG/1
88 TABLET ORAL
Status: DISCONTINUED | OUTPATIENT
Start: 2018-08-04 | End: 2018-08-06 | Stop reason: HOSPADM

## 2018-08-03 RX ORDER — ARFORMOTEROL TARTRATE 15 UG/2ML
15 SOLUTION RESPIRATORY (INHALATION) 2 TIMES DAILY
Status: DISCONTINUED | OUTPATIENT
Start: 2018-08-03 | End: 2018-08-03

## 2018-08-03 RX ORDER — FLUOROMETHOLONE 1 MG/ML
1 SOLUTION/ DROPS OPHTHALMIC
Status: DISCONTINUED | OUTPATIENT
Start: 2018-08-03 | End: 2018-08-06 | Stop reason: HOSPADM

## 2018-08-03 RX ORDER — SODIUM CHLORIDE 0.9 % (FLUSH) 0.9 %
5-10 SYRINGE (ML) INJECTION EVERY 8 HOURS
Status: DISCONTINUED | OUTPATIENT
Start: 2018-08-03 | End: 2018-08-06 | Stop reason: HOSPADM

## 2018-08-03 RX ORDER — ENOXAPARIN SODIUM 100 MG/ML
40 INJECTION SUBCUTANEOUS EVERY 24 HOURS
Status: DISCONTINUED | OUTPATIENT
Start: 2018-08-03 | End: 2018-08-06 | Stop reason: HOSPADM

## 2018-08-03 RX ORDER — CYCLOSPORINE 0.5 MG/ML
1 EMULSION OPHTHALMIC 2 TIMES DAILY
Status: DISCONTINUED | OUTPATIENT
Start: 2018-08-03 | End: 2018-08-06 | Stop reason: HOSPADM

## 2018-08-03 RX ORDER — ACETAMINOPHEN 500 MG
1000 TABLET ORAL
Status: DISCONTINUED | OUTPATIENT
Start: 2018-08-03 | End: 2018-08-03 | Stop reason: HOSPADM

## 2018-08-03 RX ORDER — PREGABALIN 25 MG/1
25 CAPSULE ORAL
Status: DISCONTINUED | OUTPATIENT
Start: 2018-08-03 | End: 2018-08-03 | Stop reason: HOSPADM

## 2018-08-03 RX ORDER — TRAMADOL HYDROCHLORIDE 50 MG/1
50 TABLET ORAL 4 TIMES DAILY
Status: DISCONTINUED | OUTPATIENT
Start: 2018-08-03 | End: 2018-08-06 | Stop reason: HOSPADM

## 2018-08-03 RX ORDER — ACETAMINOPHEN 325 MG/1
650 TABLET ORAL 4 TIMES DAILY
Status: DISCONTINUED | OUTPATIENT
Start: 2018-08-03 | End: 2018-08-06 | Stop reason: HOSPADM

## 2018-08-03 RX ORDER — SODIUM CHLORIDE 0.9 % (FLUSH) 0.9 %
5-10 SYRINGE (ML) INJECTION AS NEEDED
Status: DISCONTINUED | OUTPATIENT
Start: 2018-08-03 | End: 2018-08-06 | Stop reason: HOSPADM

## 2018-08-03 RX ORDER — DOCUSATE SODIUM 100 MG/1
100 CAPSULE, LIQUID FILLED ORAL 2 TIMES DAILY
Status: DISCONTINUED | OUTPATIENT
Start: 2018-08-03 | End: 2018-08-06 | Stop reason: HOSPADM

## 2018-08-03 RX ORDER — OXYCODONE HYDROCHLORIDE 5 MG/1
5 TABLET ORAL
Status: DISCONTINUED | OUTPATIENT
Start: 2018-08-03 | End: 2018-08-04

## 2018-08-03 RX ORDER — IPRATROPIUM BROMIDE 0.5 MG/2.5ML
0.5 SOLUTION RESPIRATORY (INHALATION)
Status: DISCONTINUED | OUTPATIENT
Start: 2018-08-03 | End: 2018-08-06 | Stop reason: HOSPADM

## 2018-08-03 RX ORDER — CEFAZOLIN SODIUM/WATER 2 G/20 ML
2 SYRINGE (ML) INTRAVENOUS ONCE
Status: COMPLETED | OUTPATIENT
Start: 2018-08-03 | End: 2018-08-03

## 2018-08-03 RX ORDER — DEXAMETHASONE SODIUM PHOSPHATE 4 MG/ML
INJECTION, SOLUTION INTRA-ARTICULAR; INTRALESIONAL; INTRAMUSCULAR; INTRAVENOUS; SOFT TISSUE AS NEEDED
Status: DISCONTINUED | OUTPATIENT
Start: 2018-08-03 | End: 2018-08-03 | Stop reason: HOSPADM

## 2018-08-03 RX ORDER — SODIUM CHLORIDE, SODIUM LACTATE, POTASSIUM CHLORIDE, CALCIUM CHLORIDE 600; 310; 30; 20 MG/100ML; MG/100ML; MG/100ML; MG/100ML
125 INJECTION, SOLUTION INTRAVENOUS CONTINUOUS
Status: DISCONTINUED | OUTPATIENT
Start: 2018-08-03 | End: 2018-08-03

## 2018-08-03 RX ORDER — PROPOFOL 10 MG/ML
INJECTION, EMULSION INTRAVENOUS AS NEEDED
Status: DISCONTINUED | OUTPATIENT
Start: 2018-08-03 | End: 2018-08-03 | Stop reason: HOSPADM

## 2018-08-03 RX ORDER — HETASTARCH 6 G/100ML
250 INJECTION, SOLUTION INTRAVENOUS ONCE
Status: COMPLETED | OUTPATIENT
Start: 2018-08-03 | End: 2018-08-03

## 2018-08-03 RX ORDER — OMEPRAZOLE 20 MG/1
20 CAPSULE, DELAYED RELEASE ORAL DAILY
Status: DISCONTINUED | OUTPATIENT
Start: 2018-08-04 | End: 2018-08-06 | Stop reason: HOSPADM

## 2018-08-03 RX ORDER — LATANOPROST 50 UG/ML
1 SOLUTION/ DROPS OPHTHALMIC EVERY EVENING
Status: DISCONTINUED | OUTPATIENT
Start: 2018-08-03 | End: 2018-08-06 | Stop reason: HOSPADM

## 2018-08-03 RX ORDER — ONDANSETRON 2 MG/ML
INJECTION INTRAMUSCULAR; INTRAVENOUS AS NEEDED
Status: DISCONTINUED | OUTPATIENT
Start: 2018-08-03 | End: 2018-08-03 | Stop reason: HOSPADM

## 2018-08-03 RX ORDER — SODIUM CHLORIDE 0.9 % (FLUSH) 0.9 %
5-10 SYRINGE (ML) INJECTION AS NEEDED
Status: DISCONTINUED | OUTPATIENT
Start: 2018-08-03 | End: 2018-08-03 | Stop reason: HOSPADM

## 2018-08-03 RX ORDER — BUPROPION HYDROCHLORIDE 100 MG/1
200 TABLET, EXTENDED RELEASE ORAL 2 TIMES DAILY
COMMUNITY

## 2018-08-03 RX ORDER — CARVEDILOL 3.12 MG/1
6.25 TABLET ORAL 2 TIMES DAILY WITH MEALS
Status: DISCONTINUED | OUTPATIENT
Start: 2018-08-03 | End: 2018-08-06 | Stop reason: HOSPADM

## 2018-08-03 RX ORDER — RAMIPRIL 5 MG/1
10 CAPSULE ORAL
Status: DISCONTINUED | OUTPATIENT
Start: 2018-08-03 | End: 2018-08-06 | Stop reason: HOSPADM

## 2018-08-03 RX ORDER — CLOPIDOGREL BISULFATE 75 MG/1
75 TABLET ORAL DAILY
Status: DISCONTINUED | OUTPATIENT
Start: 2018-08-04 | End: 2018-08-06 | Stop reason: HOSPADM

## 2018-08-03 RX ORDER — ARFORMOTEROL TARTRATE 15 UG/2ML
15 SOLUTION RESPIRATORY (INHALATION)
Status: DISCONTINUED | OUTPATIENT
Start: 2018-08-03 | End: 2018-08-06 | Stop reason: HOSPADM

## 2018-08-03 RX ORDER — DULOXETIN HYDROCHLORIDE 60 MG/1
60 CAPSULE, DELAYED RELEASE ORAL 2 TIMES DAILY
Status: DISCONTINUED | OUTPATIENT
Start: 2018-08-03 | End: 2018-08-06 | Stop reason: HOSPADM

## 2018-08-03 RX ORDER — NEOSTIGMINE METHYLSULFATE 5 MG/5 ML
SYRINGE (ML) INTRAVENOUS AS NEEDED
Status: DISCONTINUED | OUTPATIENT
Start: 2018-08-03 | End: 2018-08-03 | Stop reason: HOSPADM

## 2018-08-03 RX ORDER — DEXTROSE, SODIUM CHLORIDE, SODIUM LACTATE, POTASSIUM CHLORIDE, AND CALCIUM CHLORIDE 5; .6; .31; .03; .02 G/100ML; G/100ML; G/100ML; G/100ML; G/100ML
75 INJECTION, SOLUTION INTRAVENOUS CONTINUOUS
Status: DISPENSED | OUTPATIENT
Start: 2018-08-03 | End: 2018-08-04

## 2018-08-03 RX ORDER — BUPROPION HYDROCHLORIDE 100 MG/1
200 TABLET ORAL 2 TIMES DAILY
Status: DISCONTINUED | OUTPATIENT
Start: 2018-08-03 | End: 2018-08-03 | Stop reason: ALTCHOICE

## 2018-08-03 RX ORDER — ROCURONIUM BROMIDE 10 MG/ML
INJECTION, SOLUTION INTRAVENOUS AS NEEDED
Status: DISCONTINUED | OUTPATIENT
Start: 2018-08-03 | End: 2018-08-03 | Stop reason: HOSPADM

## 2018-08-03 RX ORDER — BUDESONIDE 0.5 MG/2ML
500 INHALANT ORAL
Status: DISCONTINUED | OUTPATIENT
Start: 2018-08-03 | End: 2018-08-06 | Stop reason: HOSPADM

## 2018-08-03 RX ORDER — IPRATROPIUM BROMIDE 0.5 MG/2.5ML
0.5 SOLUTION RESPIRATORY (INHALATION) 3 TIMES DAILY
Status: DISCONTINUED | OUTPATIENT
Start: 2018-08-03 | End: 2018-08-03

## 2018-08-03 RX ORDER — ZOLPIDEM TARTRATE 5 MG/1
5 TABLET ORAL
Status: DISCONTINUED | OUTPATIENT
Start: 2018-08-03 | End: 2018-08-06 | Stop reason: HOSPADM

## 2018-08-03 RX ORDER — MIDAZOLAM HYDROCHLORIDE 1 MG/ML
INJECTION, SOLUTION INTRAMUSCULAR; INTRAVENOUS AS NEEDED
Status: DISCONTINUED | OUTPATIENT
Start: 2018-08-03 | End: 2018-08-03 | Stop reason: HOSPADM

## 2018-08-03 RX ORDER — PRAVASTATIN SODIUM 20 MG/1
80 TABLET ORAL
Status: DISCONTINUED | OUTPATIENT
Start: 2018-08-03 | End: 2018-08-06 | Stop reason: HOSPADM

## 2018-08-03 RX ORDER — AMLODIPINE BESYLATE 5 MG/1
5 TABLET ORAL
Status: DISCONTINUED | OUTPATIENT
Start: 2018-08-03 | End: 2018-08-06 | Stop reason: HOSPADM

## 2018-08-03 RX ORDER — INDAPAMIDE 2.5 MG/1
2.5 TABLET, FILM COATED ORAL DAILY
Status: DISCONTINUED | OUTPATIENT
Start: 2018-08-04 | End: 2018-08-06 | Stop reason: HOSPADM

## 2018-08-03 RX ORDER — SODIUM CHLORIDE, SODIUM LACTATE, POTASSIUM CHLORIDE, CALCIUM CHLORIDE 600; 310; 30; 20 MG/100ML; MG/100ML; MG/100ML; MG/100ML
125 INJECTION, SOLUTION INTRAVENOUS CONTINUOUS
Status: DISCONTINUED | OUTPATIENT
Start: 2018-08-03 | End: 2018-08-03 | Stop reason: HOSPADM

## 2018-08-03 RX ORDER — DIPHENHYDRAMINE HYDROCHLORIDE 50 MG/ML
12.5 INJECTION, SOLUTION INTRAMUSCULAR; INTRAVENOUS
Status: DISCONTINUED | OUTPATIENT
Start: 2018-08-03 | End: 2018-08-06 | Stop reason: HOSPADM

## 2018-08-03 RX ORDER — GLYCOPYRROLATE 0.2 MG/ML
INJECTION INTRAMUSCULAR; INTRAVENOUS AS NEEDED
Status: DISCONTINUED | OUTPATIENT
Start: 2018-08-03 | End: 2018-08-03 | Stop reason: HOSPADM

## 2018-08-03 RX ORDER — LIDOCAINE HYDROCHLORIDE 20 MG/ML
INJECTION, SOLUTION EPIDURAL; INFILTRATION; INTRACAUDAL; PERINEURAL AS NEEDED
Status: DISCONTINUED | OUTPATIENT
Start: 2018-08-03 | End: 2018-08-03 | Stop reason: HOSPADM

## 2018-08-03 RX ORDER — EPHEDRINE SULFATE/0.9% NACL/PF 25 MG/5 ML
SYRINGE (ML) INTRAVENOUS AS NEEDED
Status: DISCONTINUED | OUTPATIENT
Start: 2018-08-03 | End: 2018-08-03 | Stop reason: HOSPADM

## 2018-08-03 RX ORDER — ESTRADIOL 0.1 MG/G
2 CREAM VAGINAL
Status: DISCONTINUED | OUTPATIENT
Start: 2018-08-03 | End: 2018-08-06 | Stop reason: HOSPADM

## 2018-08-03 RX ORDER — METOCLOPRAMIDE HYDROCHLORIDE 5 MG/ML
10 INJECTION INTRAMUSCULAR; INTRAVENOUS
Status: DISCONTINUED | OUTPATIENT
Start: 2018-08-03 | End: 2018-08-06 | Stop reason: HOSPADM

## 2018-08-03 RX ORDER — BUPROPION HYDROCHLORIDE 100 MG/1
200 TABLET, EXTENDED RELEASE ORAL 2 TIMES DAILY
Status: DISCONTINUED | OUTPATIENT
Start: 2018-08-03 | End: 2018-08-06 | Stop reason: HOSPADM

## 2018-08-03 RX ORDER — FENTANYL CITRATE 50 UG/ML
INJECTION, SOLUTION INTRAMUSCULAR; INTRAVENOUS AS NEEDED
Status: DISCONTINUED | OUTPATIENT
Start: 2018-08-03 | End: 2018-08-03 | Stop reason: HOSPADM

## 2018-08-03 RX ORDER — HYDROMORPHONE HYDROCHLORIDE 2 MG/ML
1 INJECTION, SOLUTION INTRAMUSCULAR; INTRAVENOUS; SUBCUTANEOUS
Status: DISCONTINUED | OUTPATIENT
Start: 2018-08-03 | End: 2018-08-06 | Stop reason: HOSPADM

## 2018-08-03 RX ORDER — CEFAZOLIN SODIUM/WATER 2 G/20 ML
2 SYRINGE (ML) INTRAVENOUS EVERY 8 HOURS
Status: COMPLETED | OUTPATIENT
Start: 2018-08-03 | End: 2018-08-03

## 2018-08-03 RX ADMIN — MIDAZOLAM HYDROCHLORIDE 1 MG: 1 INJECTION, SOLUTION INTRAMUSCULAR; INTRAVENOUS at 07:33

## 2018-08-03 RX ADMIN — DULOXETINE HYDROCHLORIDE 60 MG: 60 CAPSULE, DELAYED RELEASE ORAL at 20:14

## 2018-08-03 RX ADMIN — ACETAMINOPHEN 1000 MG: 500 TABLET ORAL at 07:10

## 2018-08-03 RX ADMIN — DOCUSATE SODIUM 100 MG: 100 CAPSULE, LIQUID FILLED ORAL at 14:54

## 2018-08-03 RX ADMIN — HETASTARCH 250 ML: 6 INJECTION, SOLUTION INTRAVENOUS at 10:06

## 2018-08-03 RX ADMIN — FENTANYL CITRATE 50 MCG: 50 INJECTION, SOLUTION INTRAMUSCULAR; INTRAVENOUS at 09:19

## 2018-08-03 RX ADMIN — DOCUSATE SODIUM 100 MG: 100 CAPSULE, LIQUID FILLED ORAL at 20:15

## 2018-08-03 RX ADMIN — ACETAMINOPHEN 650 MG: 325 TABLET, FILM COATED ORAL at 20:15

## 2018-08-03 RX ADMIN — SODIUM CHLORIDE, SODIUM LACTATE, POTASSIUM CHLORIDE, AND CALCIUM CHLORIDE: 600; 310; 30; 20 INJECTION, SOLUTION INTRAVENOUS at 07:33

## 2018-08-03 RX ADMIN — SODIUM CHLORIDE, SODIUM LACTATE, POTASSIUM CHLORIDE, CALCIUM CHLORIDE, AND DEXTROSE MONOHYDRATE 75 ML/HR: 600; 310; 30; 20; 5 INJECTION, SOLUTION INTRAVENOUS at 14:34

## 2018-08-03 RX ADMIN — IPRATROPIUM BROMIDE 0.5 MG: 0.5 SOLUTION RESPIRATORY (INHALATION) at 19:26

## 2018-08-03 RX ADMIN — SODIUM CHLORIDE, SODIUM LACTATE, POTASSIUM CHLORIDE, CALCIUM CHLORIDE, AND DEXTROSE MONOHYDRATE 75 ML/HR: 600; 310; 30; 20; 5 INJECTION, SOLUTION INTRAVENOUS at 20:05

## 2018-08-03 RX ADMIN — OXYCODONE HYDROCHLORIDE 5 MG: 5 TABLET ORAL at 22:22

## 2018-08-03 RX ADMIN — PRAVASTATIN SODIUM 80 MG: 20 TABLET ORAL at 22:32

## 2018-08-03 RX ADMIN — DEXAMETHASONE SODIUM PHOSPHATE 4 MG: 4 INJECTION, SOLUTION INTRA-ARTICULAR; INTRALESIONAL; INTRAMUSCULAR; INTRAVENOUS; SOFT TISSUE at 08:05

## 2018-08-03 RX ADMIN — TRAMADOL HYDROCHLORIDE 50 MG: 50 TABLET, FILM COATED ORAL at 14:53

## 2018-08-03 RX ADMIN — ONDANSETRON 4 MG: 2 INJECTION INTRAMUSCULAR; INTRAVENOUS at 07:59

## 2018-08-03 RX ADMIN — BUPROPION HYDROCHLORIDE 200 MG: 100 TABLET, FILM COATED, EXTENDED RELEASE ORAL at 20:15

## 2018-08-03 RX ADMIN — CYCLOSPORINE 1 DROP: 0.5 EMULSION OPHTHALMIC at 22:22

## 2018-08-03 RX ADMIN — PREGABALIN 25 MG: 25 CAPSULE ORAL at 07:10

## 2018-08-03 RX ADMIN — Medication 2 G: at 23:46

## 2018-08-03 RX ADMIN — ENOXAPARIN SODIUM 40 MG: 40 INJECTION, SOLUTION INTRAVENOUS; SUBCUTANEOUS at 22:22

## 2018-08-03 RX ADMIN — BUDESONIDE 500 MCG: 0.5 INHALANT RESPIRATORY (INHALATION) at 19:26

## 2018-08-03 RX ADMIN — Medication 2 G: at 17:50

## 2018-08-03 RX ADMIN — IPRATROPIUM BROMIDE 0.5 MG: 0.5 SOLUTION RESPIRATORY (INHALATION) at 13:07

## 2018-08-03 RX ADMIN — SODIUM CHLORIDE 1000 ML: 900 INJECTION, SOLUTION INTRAVENOUS at 19:00

## 2018-08-03 RX ADMIN — FENTANYL CITRATE 50 MCG: 50 INJECTION, SOLUTION INTRAMUSCULAR; INTRAVENOUS at 08:47

## 2018-08-03 RX ADMIN — PROPOFOL 150 MG: 10 INJECTION, EMULSION INTRAVENOUS at 07:38

## 2018-08-03 RX ADMIN — OXYCODONE HYDROCHLORIDE 5 MG: 5 TABLET ORAL at 10:26

## 2018-08-03 RX ADMIN — SODIUM CHLORIDE, SODIUM LACTATE, POTASSIUM CHLORIDE, AND CALCIUM CHLORIDE: 600; 310; 30; 20 INJECTION, SOLUTION INTRAVENOUS at 09:03

## 2018-08-03 RX ADMIN — Medication 10 MG: at 07:59

## 2018-08-03 RX ADMIN — TRAMADOL HYDROCHLORIDE 50 MG: 50 TABLET, FILM COATED ORAL at 20:14

## 2018-08-03 RX ADMIN — MIDAZOLAM HYDROCHLORIDE 1 MG: 1 INJECTION, SOLUTION INTRAMUSCULAR; INTRAVENOUS at 07:36

## 2018-08-03 RX ADMIN — LIDOCAINE HYDROCHLORIDE 60 MG: 20 INJECTION, SOLUTION EPIDURAL; INFILTRATION; INTRACAUDAL; PERINEURAL at 07:38

## 2018-08-03 RX ADMIN — ROCURONIUM BROMIDE 10 MG: 10 INJECTION, SOLUTION INTRAVENOUS at 08:20

## 2018-08-03 RX ADMIN — FLUOROMETHOLONE 1 DROP: 1 SOLUTION/ DROPS OPHTHALMIC at 22:22

## 2018-08-03 RX ADMIN — ARFORMOTEROL TARTRATE 15 MCG: 15 SOLUTION RESPIRATORY (INHALATION) at 19:26

## 2018-08-03 RX ADMIN — Medication 2 G: at 07:49

## 2018-08-03 RX ADMIN — GLYCOPYRROLATE 0.6 MG: 0.2 INJECTION INTRAMUSCULAR; INTRAVENOUS at 09:15

## 2018-08-03 RX ADMIN — Medication 3 MG: at 09:15

## 2018-08-03 RX ADMIN — ROCURONIUM BROMIDE 30 MG: 10 INJECTION, SOLUTION INTRAVENOUS at 07:38

## 2018-08-03 RX ADMIN — FENTANYL CITRATE 100 MCG: 50 INJECTION, SOLUTION INTRAMUSCULAR; INTRAVENOUS at 07:38

## 2018-08-03 RX ADMIN — ACETAMINOPHEN 650 MG: 325 TABLET, FILM COATED ORAL at 14:53

## 2018-08-03 RX ADMIN — HYDROMORPHONE HYDROCHLORIDE 1 MG: 2 INJECTION, SOLUTION INTRAMUSCULAR; INTRAVENOUS; SUBCUTANEOUS at 23:45

## 2018-08-03 RX ADMIN — FENTANYL CITRATE 50 MCG: 50 INJECTION, SOLUTION INTRAMUSCULAR; INTRAVENOUS at 08:11

## 2018-08-03 NOTE — ROUTINE PROCESS
TRANSFER - IN REPORT: 
 
Verbal report received from [address] [place] [address]. RN(name) on Yamel Moore  being received from PACU(unit) for routine post - op Report consisted of patients Situation, Background, Assessment and  
Recommendations(SBAR). Information from the following report(s) SBAR, Kardex, Intake/Output and MAR was reviewed with the receiving nurse. Opportunity for questions and clarification was provided. Assessment completed upon patients arrival to unit and care assumed.
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
## 2018-08-03 NOTE — ANESTHESIA PREPROCEDURE EVALUATION
Anesthetic History No history of anesthetic complications Review of Systems / Medical History Patient summary reviewed, nursing notes reviewed and pertinent labs reviewed Pulmonary COPD Comments: Wears O2 at night Neuro/Psych CVA: no residual symptoms TIA and psychiatric history Cardiovascular Hypertension CAD Exercise tolerance: >4 METS 
  
GI/Hepatic/Renal 
  
GERD: well controlled Endo/Other Hypothyroidism Arthritis Other Findings Physical Exam 
 
Airway Mallampati: III 
TM Distance: < 4 cm Neck ROM: decreased range of motion Mouth opening: Normal 
 
 Cardiovascular Regular rate and rhythm,  S1 and S2 normal,  no murmur, click, rub, or gallop Rhythm: regular Rate: normal 
 
 
 
 Dental 
 
Dentition: Full upper dentures Pulmonary Breath sounds clear to auscultation Abdominal 
GI exam deferred Other Findings Anesthetic Plan ASA: 3 Anesthesia type: general 
 
 
 
 
Induction: Intravenous Anesthetic plan and risks discussed with: Patient Patient is aware she is at higher risk of cardiopulmonary complications (including need for postop ventilation) periop. She understands and desires to proceed.

## 2018-08-03 NOTE — PERIOP NOTES
Dr Santos Thompson notified that patient had Plavix and aspirin last on 7/30/18. No orders given. Okay to proceed per Dr Santos Thompson.

## 2018-08-03 NOTE — BRIEF OP NOTE
BRIEF OPERATIVE NOTE Date of Procedure: 8/3/2018 Preoperative Diagnosis: OSTEOARTHRITIS RIGHT HIP, Postoperative Diagnosis: same Procedure(s): RIGHT TOTAL HIP REPLACEMENT ANTERIOR APPROACH WITH C-ARM **SPEC POP** Surgeon(s) and Role: Helene Kumar MD - Primary Surgical Assistant: none Surgical Staff: * No surgical staff found * No case tracking events are documented in the log. Anesthesia: General  
Estimated Blood Loss: 50ml Specimens: * No specimens in log * Findings: djd Complications: none Implants: * No implants in log *

## 2018-08-03 NOTE — PERIOP NOTES
Received patient from, OR nurse & anesthesia provide, with patient identification completed, review of procedure, and intraoperative course. Dr Denisse Bhakta here to assess patient. Aware of I\"S & O\"S intraop, including EBL and current vital signs including BP.  Advised Dr Denisse Bhakta that Dr Terence Harris offered no orders for H & H.

## 2018-08-03 NOTE — PERIOP NOTES
Patient received by Himanshu Warner RN dual skin ck completed with no changes from PACU assessment. Visit Vitals  /51  Pulse 74  Temp 98 °F (36.7 °C)  Resp 16  
 Ht 5' (1.524 m)  Wt 57.9 kg (127 lb 11.2 oz)  SpO2 98%  BMI 24.94 kg/m2

## 2018-08-03 NOTE — OP NOTES
[address] REPORT    Melva Poster  MR#: [id number]  : 1940  ACCOUNT #: [de-identified]   DATE OF SERVICE: 2018    SURGEON:  Wiley Valenzuela MD    ANESTHESIOLOGIST:  Dr. Shane Cowart was the anesthesiologist.    ASSISTANT:  There were no assistants. ANESTHESIA:  General.    SPECIMENS REMOVED:  There was no specimen. ESTIMATED BLOOD LOSS:  50 mL. COMPLICATIONS:  No complication. IMPLANTS:  A DePuy right total hip replacement. INDICATIONS:  A 21-year-old female with right hip arthritis pain. POSTOPERATIVE DIAGNOSIS:  Right hip degenerative joint disease. POSTOPERATIVE DIAGNOSIS:  Right hip degenerative joint disease. PROCEDURE PERFORMED:  Right total hip replacement. NARRATIVE:  The patient was brought to the operating room after receiving antibiotics in the OR. General anesthesia was administered. Thurston catheter was placed. She was transferred to the [facility] table. Right hip and thigh was prepped and draped sterilely. An 8 cm incision was made just distal lateral to the anterior superior iliac spine heading towards the greater trochanter. After getting through the skin and subcu, the fascia over the tensor fascia servando was excised. The superior flap was raised superiorly. Finger dissection was then used down to the femoral neck. A retractor was placed. At this point, the rectus femoris was identified. Retractor was placed up under this over top of the acetabulum. The lateral circumflex artery and vein were bovied. The capsule was excised, revealing the head and neck. The neck was cut and the ball was delivered. The retractors were placed around the acetabulum. It was reamed to 47 mm and a DePuy 48 mm porous cup was impacted and verified under fluoroscopy. It was secured with a hole eliminator and a single screw. A liner was then placed and this was neutral to accept a 32 mm head ball.   At this point, retractors were placed around the proximal femur. The femur was then broached to accept a size 2 stem. The size 2 stem was impacted. This was a short stature collared stem. Off of this, a +5 neck length 32 mm head ball resulted in equal leg lengths and stability, and after verifying this under fluoroscopy, the actual size +5 32 mm head ball was placed, which was ceramic. A final reduction was carried out. The Aquamantys was used to control hemostasis. Long-acting local was injected around the operative site. The defect in the iliotibial band was closed with Vicryl, subcu with Vicryl. Skin was closed with staples and Dermabond. A light dressing was applied and the patient went to recovery in stable condition.       MD MERLYN Randhawa /   D: 08/03/2018 09:15     T: 08/03/2018 16:36  JOB #: [id number]

## 2018-08-03 NOTE — PERIOP NOTES
Administered acetaminophen and Lyrica 8/3/18 at 0710. Somehow when scanning did not record the administration and it showed it discontinued 3 times. I reentered the [facility] recorded the administration times. PACU nurse notified patient did receive these 2 medications.

## 2018-08-03 NOTE — PERIOP NOTES
TRANSFER - OUT REPORT: 
 
Verbal report given to Chris(name) on Sharon Pineda  being transferred to [facility](unit) for routine progression of care Report consisted of patients Situation, Background, Assessment and  
Recommendations(SBAR). Information from the following report(s) Procedure Summary, Intake/Output, MAR and Recent Results was reviewed with the receiving nurse. Lines:  
Peripheral IV 08/03/18 Left; Inner Forearm (Active) Site Assessment Clean, dry, & intact 8/3/2018  9:49 AM  
Phlebitis Assessment 0 8/3/2018  9:49 AM  
Infiltration Assessment 0 8/3/2018  9:49 AM  
Dressing Status Clean, dry, & intact 8/3/2018  9:49 AM  
Dressing Type Tape;Transparent 8/3/2018  9:49 AM  
Hub Color/Line Status Infusing 8/3/2018  9:49 AM  
  
 
Opportunity for questions and clarification was provided. Patient transported with: 
 O2 @ 2lnc liters Also reviewed history as recorded in EMR as well as EBL & H &H & I &O's & BP course today and in PACU with interventions as well as communication as documented with surgeon regarding no need for H&H

## 2018-08-03 NOTE — PROGRESS NOTES
1112: Assessment completed. Patient is A&OX4. Patient is calm and cooperative. Petechia noted to both lateral side of the thigh. Patient's oral mucosa pink and moist, strong  on both upper extremities. Lung sound clear, not appear distress. Bowel sounds active. Pedal pulses are palpable, no complain of any numbness or tingling. IV site dry and dressing intact. Mepilex dressing to right hip is clean and dry, petechia noted to both lateral thigh. Thurston catheter is in place, draining yellow clear urine. Ice is applied. SCD and Joe hose are applied. Pain is 10/10. bed is in lower position, call bell and personal items are within reach. Pain regimen and activities are educated, educated pt to call when getting up to prevent fall. Pt verbalized understanding. 1212: Pt had her denture in her mouth. 1249: Page Dr Lulu Mejia at this time about diet, He is currently in the OR. Left a message to [facility]. [address]. 1822: Pt is on Corged PO 6.25mg with dinner and BP was 104/49. Page Dr Soy Nichole at this time. 1839: Spoke with Dr Soy Nichole at this time. Ordered 1L NS bolus and hold the Corged at this time. He made sure pt had H&H order for tomorrow. 2008: Bolus completed, /43 is HR is 78, pt is currently resting in the bed.

## 2018-08-03 NOTE — ANESTHESIA POSTPROCEDURE EVALUATION
Post-Anesthesia Evaluation and Assessment Cardiovascular Function/Vital Signs Visit Vitals  /49  Pulse 74  Temp 37.2 °C (99 °F)  Resp 16  
 Ht 5' (1.524 m)  Wt 57.9 kg (127 lb 11.2 oz)  SpO2 100%  BMI 24.94 kg/m2 Patient is status post Procedure(s): RIGHT TOTAL HIP REPLACEMENT ANTERIOR APPROACH WITH C-ARM **SPEC POP** . Nausea/Vomiting: Controlled. Postoperative hydration reviewed and adequate. Pain: 
Pain Scale 1: Numeric (0 - 10) (08/03/18 1028) Pain Intensity 1: 5 (08/03/18 1028) Managed. Neurological Status:  
Neuro (WDL): Exceptions to WDL (unsteady gait uses cane ) (08/03/18 1934) At baseline. Mental Status and Level of Consciousness: Arousable. Pulmonary Status:  
O2 Device: Nasal cannula (08/03/18 1028) Adequate oxygenation and airway patent. Complications related to anesthesia: None Post-anesthesia assessment completed. No concerns. Patient has met all discharge requirements. Signed By: Familia Barth CRNA August 3, 2018

## 2018-08-03 NOTE — PROGRESS NOTES
Transition of Care (NASIR) Plan:     Chart reviewed, met with pt in room. Pt has been evaluated by PT, recommendation for [facility] noted. FOC offered, pt chose [facility]  1303 for follow up; referral placed with CMS. Pt does not have RW, [facility]  is following up on ordering RW for delivery to pt room tomorrow. If walker not delivered, pt will need to obtain walker on her own, either buying or borrowing. Pt verbalizes understanding. NASIR Transportation: How is patient being transported at discharge? Family/Friend When? Once cleared by Therapy between 12-2pm 
 
 Is transport scheduled? N/A Follow-up appointment and transportation: PCP/Specialist?  See AVS for Appointment Who is transporting to the follow-up appointment? Family/Friend Is transport for follow up appointment scheduled? N/A Communication plan (with patient/family): Who is being called? Patient or Next of Kin? Responsible party? Patient What number(s) is to be used? See [employer] What service provider is calling for [facility] services? When are they calling? 24-48 hours following discharge Readmission Risk? (Green/Low; Yellow/Moderate; Red/High):  Coleman Serra Care Management Interventions PCP Verified by CM: Yes Transition of Care Consult (CM Consult): Home Health [address]: No 
Reason Outside [place]: Physician referred to specific agency ([facility]) Discharge Durable Medical Equipment: Yes Physical Therapy Consult: Yes Occupational Therapy Consult: Yes Current Support Network: Own Home Confirm Follow Up Transport: Family Plan discussed with Pt/Family/Caregiver: Yes Freedom of Choice Offered: Yes Discharge Location Discharge Placement: Home with home health
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
## 2018-08-04 LAB
ANION GAP SERPL CALC-SCNC: 5 MMOL/L (ref 3–18)
BUN SERPL-MCNC: 19 MG/DL (ref 7–18)
BUN/CREAT SERPL: 17 (ref 12–20)
CALCIUM SERPL-MCNC: 8.4 MG/DL (ref 8.5–10.1)
CHLORIDE SERPL-SCNC: 109 MMOL/L (ref 100–108)
CO2 SERPL-SCNC: 28 MMOL/L (ref 21–32)
CREAT SERPL-MCNC: 1.14 MG/DL (ref 0.6–1.3)
ERYTHROCYTE [DISTWIDTH] IN BLOOD BY AUTOMATED COUNT: 13.2 % (ref 11.6–14.5)
GLUCOSE SERPL-MCNC: 119 MG/DL (ref 74–99)
HCT VFR BLD AUTO: 23.9 % (ref 35–45)
HCT VFR BLD AUTO: 27.8 % (ref 35–45)
HGB BLD-MCNC: 7.7 G/DL (ref 12–16)
HGB BLD-MCNC: 9.3 G/DL (ref 12–16)
MCH RBC QN AUTO: 28.8 PG (ref 24–34)
MCHC RBC AUTO-ENTMCNC: 32.2 G/DL (ref 31–37)
MCV RBC AUTO: 89.5 FL (ref 74–97)
PLATELET # BLD AUTO: 188 K/UL (ref 135–420)
PMV BLD AUTO: 9.5 FL (ref 9.2–11.8)
POTASSIUM SERPL-SCNC: 3.7 MMOL/L (ref 3.5–5.5)
RBC # BLD AUTO: 2.67 M/UL (ref 4.2–5.3)
SODIUM SERPL-SCNC: 142 MMOL/L (ref 136–145)
WBC # BLD AUTO: 8.1 K/UL (ref 4.6–13.2)

## 2018-08-04 PROCEDURE — 97110 THERAPEUTIC EXERCISES: CPT

## 2018-08-04 PROCEDURE — 74011000250 HC RX REV CODE- 250: Performed by: ORTHOPAEDIC SURGERY

## 2018-08-04 PROCEDURE — 85018 HEMOGLOBIN: CPT | Performed by: ORTHOPAEDIC SURGERY

## 2018-08-04 PROCEDURE — 36415 COLL VENOUS BLD VENIPUNCTURE: CPT | Performed by: ORTHOPAEDIC SURGERY

## 2018-08-04 PROCEDURE — 74011250637 HC RX REV CODE- 250/637: Performed by: ORTHOPAEDIC SURGERY

## 2018-08-04 PROCEDURE — 80048 BASIC METABOLIC PNL TOTAL CA: CPT | Performed by: ORTHOPAEDIC SURGERY

## 2018-08-04 PROCEDURE — 36430 TRANSFUSION BLD/BLD COMPNT: CPT

## 2018-08-04 PROCEDURE — 94760 N-INVAS EAR/PLS OXIMETRY 1: CPT

## 2018-08-04 PROCEDURE — 97116 GAIT TRAINING THERAPY: CPT

## 2018-08-04 PROCEDURE — P9016 RBC LEUKOCYTES REDUCED: HCPCS | Performed by: ORTHOPAEDIC SURGERY

## 2018-08-04 PROCEDURE — 85027 COMPLETE CBC AUTOMATED: CPT | Performed by: ORTHOPAEDIC SURGERY

## 2018-08-04 PROCEDURE — 74011250637 HC RX REV CODE- 250/637: Performed by: PHYSICIAN ASSISTANT

## 2018-08-04 PROCEDURE — 65270000029 HC RM PRIVATE

## 2018-08-04 PROCEDURE — 74011250636 HC RX REV CODE- 250/636: Performed by: ORTHOPAEDIC SURGERY

## 2018-08-04 PROCEDURE — 30233N1 TRANSFUSION OF NONAUTOLOGOUS RED BLOOD CELLS INTO PERIPHERAL VEIN, PERCUTANEOUS APPROACH: ICD-10-PCS | Performed by: ORTHOPAEDIC SURGERY

## 2018-08-04 PROCEDURE — 94640 AIRWAY INHALATION TREATMENT: CPT

## 2018-08-04 RX ORDER — HYDROMORPHONE HYDROCHLORIDE 2 MG/1
2 TABLET ORAL
Status: DISCONTINUED | OUTPATIENT
Start: 2018-08-04 | End: 2018-08-06 | Stop reason: HOSPADM

## 2018-08-04 RX ORDER — SODIUM CHLORIDE 9 MG/ML
250 INJECTION, SOLUTION INTRAVENOUS AS NEEDED
Status: DISCONTINUED | OUTPATIENT
Start: 2018-08-04 | End: 2018-08-06 | Stop reason: HOSPADM

## 2018-08-04 RX ADMIN — Medication 10 ML: at 05:52

## 2018-08-04 RX ADMIN — BUPROPION HYDROCHLORIDE 200 MG: 100 TABLET, FILM COATED, EXTENDED RELEASE ORAL at 20:18

## 2018-08-04 RX ADMIN — DULOXETINE HYDROCHLORIDE 60 MG: 60 CAPSULE, DELAYED RELEASE ORAL at 20:18

## 2018-08-04 RX ADMIN — Medication 10 ML: at 22:51

## 2018-08-04 RX ADMIN — HYDROMORPHONE HYDROCHLORIDE 2 MG: 2 TABLET ORAL at 22:55

## 2018-08-04 RX ADMIN — ACETAMINOPHEN 650 MG: 325 TABLET, FILM COATED ORAL at 20:18

## 2018-08-04 RX ADMIN — DOCUSATE SODIUM 100 MG: 100 CAPSULE, LIQUID FILLED ORAL at 08:47

## 2018-08-04 RX ADMIN — IPRATROPIUM BROMIDE 0.5 MG: 0.5 SOLUTION RESPIRATORY (INHALATION) at 14:40

## 2018-08-04 RX ADMIN — TRAMADOL HYDROCHLORIDE 50 MG: 50 TABLET, FILM COATED ORAL at 03:05

## 2018-08-04 RX ADMIN — IPRATROPIUM BROMIDE 0.5 MG: 0.5 SOLUTION RESPIRATORY (INHALATION) at 20:33

## 2018-08-04 RX ADMIN — FLUOROMETHOLONE 1 DROP: 1 SOLUTION/ DROPS OPHTHALMIC at 22:44

## 2018-08-04 RX ADMIN — HYDROMORPHONE HYDROCHLORIDE 2 MG: 2 TABLET ORAL at 10:54

## 2018-08-04 RX ADMIN — DULOXETINE HYDROCHLORIDE 60 MG: 60 CAPSULE, DELAYED RELEASE ORAL at 08:47

## 2018-08-04 RX ADMIN — BUDESONIDE 500 MCG: 0.5 INHALANT RESPIRATORY (INHALATION) at 20:33

## 2018-08-04 RX ADMIN — RAMIPRIL 10 MG: 5 CAPSULE ORAL at 22:43

## 2018-08-04 RX ADMIN — CARVEDILOL 6.25 MG: 12.5 TABLET, FILM COATED ORAL at 16:27

## 2018-08-04 RX ADMIN — CYCLOSPORINE 1 DROP: 0.5 EMULSION OPHTHALMIC at 08:48

## 2018-08-04 RX ADMIN — PRAVASTATIN SODIUM 80 MG: 20 TABLET ORAL at 22:43

## 2018-08-04 RX ADMIN — ARFORMOTEROL TARTRATE 15 MCG: 15 SOLUTION RESPIRATORY (INHALATION) at 10:26

## 2018-08-04 RX ADMIN — LEVOTHYROXINE SODIUM 88 MCG: 88 TABLET ORAL at 07:22

## 2018-08-04 RX ADMIN — TRAMADOL HYDROCHLORIDE 50 MG: 50 TABLET, FILM COATED ORAL at 13:23

## 2018-08-04 RX ADMIN — TRAMADOL HYDROCHLORIDE 50 MG: 50 TABLET, FILM COATED ORAL at 08:47

## 2018-08-04 RX ADMIN — BUDESONIDE 500 MCG: 0.5 INHALANT RESPIRATORY (INHALATION) at 10:26

## 2018-08-04 RX ADMIN — HYDROMORPHONE HYDROCHLORIDE 2 MG: 2 TABLET ORAL at 16:27

## 2018-08-04 RX ADMIN — ACETAMINOPHEN 650 MG: 325 TABLET, FILM COATED ORAL at 08:47

## 2018-08-04 RX ADMIN — Medication 10 ML: at 13:24

## 2018-08-04 RX ADMIN — TRAMADOL HYDROCHLORIDE 50 MG: 50 TABLET, FILM COATED ORAL at 20:18

## 2018-08-04 RX ADMIN — HYDROMORPHONE HYDROCHLORIDE 1 MG: 2 INJECTION, SOLUTION INTRAMUSCULAR; INTRAVENOUS; SUBCUTANEOUS at 05:51

## 2018-08-04 RX ADMIN — ARFORMOTEROL TARTRATE 15 MCG: 15 SOLUTION RESPIRATORY (INHALATION) at 20:33

## 2018-08-04 RX ADMIN — BUPROPION HYDROCHLORIDE 200 MG: 100 TABLET, FILM COATED, EXTENDED RELEASE ORAL at 08:47

## 2018-08-04 RX ADMIN — OMEPRAZOLE 20 MG: 20 CAPSULE, DELAYED RELEASE ORAL at 08:47

## 2018-08-04 RX ADMIN — ACETAMINOPHEN 650 MG: 325 TABLET, FILM COATED ORAL at 03:05

## 2018-08-04 RX ADMIN — OXYCODONE HYDROCHLORIDE 5 MG: 5 TABLET ORAL at 03:08

## 2018-08-04 RX ADMIN — AMLODIPINE BESYLATE 5 MG: 5 TABLET ORAL at 22:43

## 2018-08-04 RX ADMIN — CYCLOSPORINE 1 DROP: 0.5 EMULSION OPHTHALMIC at 22:44

## 2018-08-04 RX ADMIN — CLOPIDOGREL BISULFATE 75 MG: 75 TABLET ORAL at 08:47

## 2018-08-04 RX ADMIN — ENOXAPARIN SODIUM 40 MG: 40 INJECTION, SOLUTION INTRAVENOUS; SUBCUTANEOUS at 22:43

## 2018-08-04 RX ADMIN — DOCUSATE SODIUM 100 MG: 100 CAPSULE, LIQUID FILLED ORAL at 20:18

## 2018-08-04 RX ADMIN — IPRATROPIUM BROMIDE 0.5 MG: 0.5 SOLUTION RESPIRATORY (INHALATION) at 10:26

## 2018-08-04 RX ADMIN — ACETAMINOPHEN 650 MG: 325 TABLET, FILM COATED ORAL at 13:23

## 2018-08-04 NOTE — ROUTINE PROCESS
Bedside and Verbal shift change report given to LORETTA Petty RN by Mariya Claire RN. Report included the following information SBAR, Kardex, OR Summary, Intake/Output and MAR.

## 2018-08-04 NOTE — PROGRESS NOTES
2010 - Assumed care at this time. Pt resting quietly in bed. No signs of distress. Will continue to monitor. Pt encouraged to call for assistance. 2214 - Patient in bed at this time. Patient A&Ox4, RA. Denies chest pain and SOB. 18G IV to left FA  intact and patent. SCD compression device bilaterally. Mepilex dressing to right hip CDI. Denies numbness/tingling/calf pain. Pain 7/10 with a tolerable level of 3/10, pain medication administered per MAR. Thurston draining and patent. Pt educated on IS use, q2h rounds, pain management, CHG wipes and \"Up for Meals\". Pt verbalized understanding, no concerns voiced. Call bell within reach, bed in lowest position. Pt encouraged to call for assistance. 2345 - Patient rated pain 7/10, pain medication administered per MAR. Educated on side-effects, pt verbalized understanding. No other concerns voiced at this time. Call bell within reach, bed in lowest position. Pt encouraged to use call bell for any needs. 8245 - Patient rated pain 7/10, pain medication administered per MAR. Educated on side-effects, pt verbalized understanding. No other concerns voiced at this time. Call bell within reach, bed in lowest position. Pt encouraged to use call bell for any needs. 1446 - Patient rated pain 7/10, pain medication administered per MAR. Educated on side-effects, pt verbalized understanding. Thurston removed pt tolerated well. No other concerns voiced at this time. Call bell within reach, bed in lowest position. Pt encouraged to use call bell for any needs.

## 2018-08-04 NOTE — PROGRESS NOTES
Problem: Falls - Risk of 
Goal: *Absence of Falls Document Polly Gonzalez Fall Risk and appropriate interventions in the flowsheet. Outcome: Progressing Towards Goal 
Fall Risk Interventions: 
Mobility Interventions: Utilize walker, cane, or other assistive device, PT Consult for assist device competence, PT Consult for mobility concerns, Patient to call before getting OOB, OT consult for ADLs Mentation Interventions: Adequate sleep, hydration, pain control Medication Interventions: Assess postural VS orthostatic hypotension, Patient to call before getting OOB, Teach patient to arise slowly Elimination Interventions: Elevated toilet seat, Call light in reach, Toileting schedule/hourly rounds

## 2018-08-04 NOTE — PROGRESS NOTES
Bedside shift change report given to Eliezer RICKS Rn (oncoming nurse) by Jacobo COLE RN (offgoing nurse). Report included the following information SBAR, Kardex and MAR.

## 2018-08-04 NOTE — PROGRESS NOTES
Paulette 84 care of pt at this time. Pt in bed with no signs of distress. Pt left with call light within reach and encouraged to call for assistance. 2018  Head to toe assessment performed at this time. Pt denies any chest pain or SOB. Pt denies any numbness or tingling to extremities. Pt encouraged to manage pain and to use the incentive spirometer. Pt educated on the side effects of medications taken. Pt left with call light within reach and bed in low position. Will continue to monitor. Shift summary - Pt pain managed with prn medication per MAR. Pt informed about all medications and side effects and encouraged to ask questions about medication. Pt encouraged throughout the night to use incentive spirometer and the purpose of the incentive spirometer. Pt left with no signs of distress and any concerns of pt addressed.

## 2018-08-04 NOTE — PROGRESS NOTES
0770 Assumed care of pt at this time. Pt in bed with no signs of distress. Pt left with call light within reach and encouraged to call for assistance. Tucker Bryan with [facility] About coreg and Lozol schedule and pt BP is 104/51. She stated to hold it. 1807 Head to toe assessment completed at this time  Patient is A&OX4. Pt denies N/V chest pain and SOB or distress. Pt is calm and cooperative. Pedal pulses are present. Capillary refill less than 3 seconds. Skin in warm and dry with mepilex dressing on right Hip and is CDI. Lungs are clear bilaterally. Patient instructed on use and reason for incentive spirometer. Bowel sounds are active. Abdomen is soft and non-tender. SCDS in place bilaterally . Positive dorsalis pedis pulse, sensation, warm. No tingling or numbness to lower extremities. 18 g needle in the LFA. Pain scale explained to patient. Reasons for taking PRN meds explained to patient. Patient instructed to call for prn when needed. Pain level is 7, medicated as per MAR. Patient was oriented to call bell and bed function. Will continue to monitor 
 
 
[phone] Started Blood transfusion at this time, pt made aware of adverse effect and pt verbalize understanding, no complain and concern at this time. Call light with in reach and bed at lowest position 1054 Pt state pain as 7/10. Pt received medication as per MAR. Potential medication side effects explained to patient, patient verbalizes understanding, opportunities for questions provided. Patient stable, no apparent distress at this time, bed in locked position, call bell within reach. 46 Pt asked for bedpan pt voided without difficulty, call light within reach and bed at lowest position 1627 Pt state pain as 7/10. Pt received medication as per MAR. Potential medication side effects explained to patient, patient verbalizes understanding, opportunities for questions provided.   Patient stable, no apparent distress at this time, bed in locked position, call bell within reach. Shift summary - Pt pain managed with prn medication per MAR. Pt informed about all medications and side effects and encouraged to ask questions about medication. Pt encouraged throughout the day to use incentive spirometer and the purpose of the incentive spirometer. Pt left with no signs of distress and any concerns of pt addressed.

## 2018-08-04 NOTE — PROGRESS NOTES
Problem: Mobility Impaired (Adult and Pediatric) Goal: *Acute Goals and Plan of Care (Insert Text) Physical Therapy Goals Initiated 8/3/2018 and to be accomplished within 3-5 day(s) 1. Patient will move from supine <> sit with S in prep for out of bed activity and change of position. 2.  Patient will perform sit<> stand with S with LRAD in prep for transfers/ambulation. 3.  Patient will transfer from bed <> chair with S with LRAD for time up in chair for completion of ADL activity. 4.  Patient will ambulate 150 feet with LRAD/S for improved functional mobility/safe discharge. 5.  Patient will ascend/descend 3-5 stairs with handrails with contact guard assist for home re-entry as needed. Outcome: Progressing Towards Goal 
physical Therapy TREATMENT Patient: Izzy Poole [de-identified]78 y.o. female) Date: 8/4/2018 Diagnosis: OSTEOARTHRITIS RIGHT HIP, HYPERTENSION, COPD, PREVIOUS TIA, OLD/PREVIOUS MI Degenerative joint disease (DJD) of hip <principal problem not specified> Procedure(s) (LRB): 
RIGHT TOTAL HIP REPLACEMENT ANTERIOR APPROACH WITH C-ARM **SPEC POP**  (Right) 1 Day Post-Op Precautions: Fall, WBAT Chart, physical therapy assessment, plan of care and goals were reviewed. ASSESSMENT: 
Pt requires ModA for bed mobility, transfers and ambulation. Pt very anxious with jerky movement due to pain. Pt transferred to/from bedside commode with ModA. Pt reports sharp pain with weight bearing. Progress limited by pain and anxiety. Continue to recommend rehab placement upon d/c. Progression toward goals: 
[]      Improving appropriately and progressing toward goals [x]      Improving slowly and progressing toward goals 
[]      Not making progress toward goals and plan of care will be adjusted PLAN: 
Patient continues to benefit from skilled intervention to address the above impairments. Continue treatment per established plan of care. Discharge Recommendations:  Rehab Further Equipment Recommendations for Discharge:  N/A  
 
SUBJECTIVE:  
Patient stated I am doing ok.  OBJECTIVE DATA SUMMARY:  
Critical Behavior: 
Neurologic State: Alert Orientation Level: Oriented X4 Cognition: Follows commands, Decreased attention/concentration Functional Mobility Training: 
Bed Mobility: 
Supine to Sit: Moderate assistance Sit to Supine: Moderate assistance Transfers: 
Sit to Stand: Moderate assistance Stand to Sit: Moderate assistance Balance: 
Sitting: Intact; High guard; With support Standing: Intact; With support Ambulation/Gait Training: 
Distance (ft): 5 Feet (ft) Assistive Device: Gait belt;Walker, rolling Ambulation - Level of Assistance: Moderate assistance Gait Abnormalities: Decreased step clearance; Antalgic; Step to gait Right Side Weight Bearing: As tolerated Base of Support: Shift to left Stance: Right decreased Speed/Selena: Slow;Shuffled Step Length: Right shortened;Left shortened Swing Pattern: Left asymmetrical;Right asymmetrical 
Therapeutic Exercises:  
Seated there ex: LAQ, hip add, ankle pumps, x5 ea Pain: 
Pain Scale 1: Numeric (0 - 10) Pain Intensity 1: 7 Pain Location 1: Hip Pain Orientation 1: Right Pain Description 1: Aching Pain Intervention(s) 1: Medication (see MAR) Activity Tolerance:  
Fair Please refer to the flowsheet for vital signs taken during this treatment. After treatment:  
[] Patient left in no apparent distress sitting up in chair 
[x] Patient left in no apparent distress in bed 
[x] Call bell left within reach [x] Nursing notified 
[] Caregiver present 
[] Bed alarm activated Patrick Black Time Calculation: 30 mins

## 2018-08-04 NOTE — PROGRESS NOTES
Problem: Falls - Risk of 
Goal: *Absence of Falls Document Janelle Bell Fall Risk and appropriate interventions in the flowsheet. Outcome: Progressing Towards Goal 
Fall Risk Interventions: 
Mobility Interventions: Patient to call before getting OOB, PT Consult for mobility concerns, PT Consult for assist device competence, Strengthening exercises (ROM-active/passive), Utilize walker, cane, or other assistive device Mentation Interventions: Adequate sleep, hydration, pain control, Increase mobility Medication Interventions: Assess postural VS orthostatic hypotension, Teach patient to arise slowly, Patient to call before getting OOB Elimination Interventions: Call light in reach, Patient to call for help with toileting needs

## 2018-08-04 NOTE — ROUTINE PROCESS
Bedside and Verbal shift change report given to Bindu COLE RN (oncoming nurse) by Tyra Grande. Holli Ayala (offgoing nurse). Report included the following information SBAR, Kardex, Intake/Output, MAR and Alarm Parameters .

## 2018-08-04 NOTE — PROGRESS NOTES
Problem: Mobility Impaired (Adult and Pediatric) Goal: *Acute Goals and Plan of Care (Insert Text) Physical Therapy Goals Initiated 8/3/2018 and to be accomplished within 3-5 day(s) 1. Patient will move from supine <> sit with S in prep for out of bed activity and change of position. 2.  Patient will perform sit<> stand with S with LRAD in prep for transfers/ambulation. 3.  Patient will transfer from bed <> chair with S with LRAD for time up in chair for completion of ADL activity. 4.  Patient will ambulate 150 feet with LRAD/S for improved functional mobility/safe discharge. 5.  Patient will ascend/descend 3-5 stairs with handrails with contact guard assist for home re-entry as needed. Pt receiving blood transfusion. Will return later for PT session treatment as pt schedule allows.

## 2018-08-04 NOTE — PROGRESS NOTES
Problem: Mobility Impaired (Adult and Pediatric) Goal: *Acute Goals and Plan of Care (Insert Text) Physical Therapy Goals Initiated 8/3/2018 and to be accomplished within 3-5 day(s) 1. Patient will move from supine <> sit with S in prep for out of bed activity and change of position. 2.  Patient will perform sit<> stand with S with LRAD in prep for transfers/ambulation. 3.  Patient will transfer from bed <> chair with S with LRAD for time up in chair for completion of ADL activity. 4.  Patient will ambulate 150 feet with LRAD/S for improved functional mobility/safe discharge. 5.  Patient will ascend/descend 3-5 stairs with handrails with contact guard assist for home re-entry as needed. physical Therapy TREATMENT Patient: Carmina Shelley [de-identified]78 y.o. female) Date: 8/4/2018 Diagnosis: OSTEOARTHRITIS RIGHT HIP, HYPERTENSION, COPD, PREVIOUS TIA, OLD/PREVIOUS MI Degenerative joint disease (DJD) of hip <principal problem not specified> Procedure(s) (LRB): 
RIGHT TOTAL HIP REPLACEMENT ANTERIOR APPROACH WITH C-ARM **SPEC POP**  (Right) 1 Day Post-Op Precautions: Fall, WBAT Chart, physical therapy assessment, plan of care and goals were reviewed. ASSESSMENT: 
Pt reports R hip pain 7/10 on numerical pain scale. Pt is anxious due to pain but able to transfer supine>sit min A and additional time. Required additional time for all mobility and vc for taking time and breathing as pt does become anxious quickly. Pt able to sit EOB w/ mild L lean due to pain. Mod A required for sit<>stand. Attempted gt training w/ vc, RW, WBAT and mod A x3 steps limited to pain and pt unable to compensate for R LE. Instructed proper alignment of R LE as pt wants to toe out. Pt unable to fully particpate in gt training due to pain and despite TTWB instruction pt unable to initiate steps. Pt returned to supine in bed w/ all needs within reach. Stressed importance of mobility and encouraged pt to sit EOB to eat meals. Recommend rehab upon hospital d/c. Nurse Jeanne [phone] aware. Ice pack to R hip. Progression toward goals: 
[]      Improving appropriately and progressing toward goals [x]      Improving slowly and progressing toward goals 
[]      Not making progress toward goals and plan of care will be adjusted PLAN: 
Patient continues to benefit from skilled intervention to address the above impairments. Continue treatment per established plan of care. Discharge Recommendations:  Damon Evans Further Equipment Recommendations for Discharge:  rolling walker SUBJECTIVE:  
Patient stated I don't know what I can do because even thinking about it hurts me.  OBJECTIVE DATA SUMMARY:  
Critical Behavior: 
Neurologic State: Alert Orientation Level: Oriented X4 Cognition: Follows commands, Decreased attention/concentration Functional Mobility Training: 
Bed Mobility: 
Supine to Sit: Minimum assistance; Additional time (vc) Sit to Supine: Minimum assistance; Additional time (vc) Transfers: 
Sit to Stand: Moderate assistance; Additional time (vc) Stand to Sit: Minimum assistance (vc) 
Balance: 
Sitting: Intact Standing: Intact; With support Range Of Motion: 
Ambulation/Gait Training: 
Distance (ft): 1 Feet (ft) Assistive Device: Walker, rolling;Gait belt Ambulation - Level of Assistance: Moderate assistance (vc) 
Gait Abnormalities: Antalgic;Decreased step clearance;Shuffling gait; Step to gait (keeps R knee flexed throughout; vc for alignment as pt wants) Right Side Weight Bearing: As tolerated Base of Support: Shift to left; Widened Stance: Right decreased Speed/Selena: Delayed Step Length: Left shortened;Right shortened Swing Pattern: Left asymmetrical;Right asymmetrical 
Neuro Re-Education: 
Therapeutic Exercises:  
B ankle pumps, ankle circles, quad sets x10 reps each. R heel slides AA x5 reps each.   Pt grimaces in pain w/ all movement even w/ ankle pumps but no evidence of complications. Pain: 
Pain Scale 1: Numeric (0 - 10) Pain Intensity 1: 7 Pain Location 1: Hip Pain Orientation 1: Right Pain Description 1: Aching Pain Intervention(s) 1: Medication (see MAR) Activity Tolerance:  
Fair Please refer to the flowsheet for vital signs taken during this treatment. After treatment:  
[] Patient left in no apparent distress sitting up in chair 
[x] Patient left in no apparent distress in bed 
[x] Call bell left within reach [x] Nursing notified 
[] Caregiver present 
[] Bed alarm activated Felisha Sharp PT Time Calculation: 29 mins

## 2018-08-05 ENCOUNTER — APPOINTMENT (OUTPATIENT)
Dept: GENERAL RADIOLOGY | Age: 78
DRG: 470 | End: 2018-08-05
Attending: ORTHOPAEDIC SURGERY
Payer: MEDICARE

## 2018-08-05 LAB
ABO + RH BLD: NORMAL
BASOPHILS # BLD: 0 K/UL (ref 0–0.1)
BASOPHILS NFR BLD: 0 % (ref 0–2)
BLD PROD TYP BPU: NORMAL
BLOOD GROUP ANTIBODIES SERPL: NORMAL
BPU ID: NORMAL
CALLED TO:,BCALL1: NORMAL
CROSSMATCH RESULT,%XM: NORMAL
DIFFERENTIAL METHOD BLD: ABNORMAL
EOSINOPHIL # BLD: 0.2 K/UL (ref 0–0.4)
EOSINOPHIL NFR BLD: 2 % (ref 0–5)
ERYTHROCYTE [DISTWIDTH] IN BLOOD BY AUTOMATED COUNT: 13.3 % (ref 11.6–14.5)
HCT VFR BLD AUTO: 29.3 % (ref 35–45)
HGB BLD-MCNC: 9.5 G/DL (ref 12–16)
LYMPHOCYTES # BLD: 1.2 K/UL (ref 0.9–3.6)
LYMPHOCYTES NFR BLD: 12 % (ref 21–52)
MCH RBC QN AUTO: 29 PG (ref 24–34)
MCHC RBC AUTO-ENTMCNC: 32.4 G/DL (ref 31–37)
MCV RBC AUTO: 89.3 FL (ref 74–97)
MONOCYTES # BLD: 1 K/UL (ref 0.05–1.2)
MONOCYTES NFR BLD: 10 % (ref 3–10)
NEUTS SEG # BLD: 7.3 K/UL (ref 1.8–8)
NEUTS SEG NFR BLD: 76 % (ref 40–73)
PLATELET # BLD AUTO: 188 K/UL (ref 135–420)
PMV BLD AUTO: 9.4 FL (ref 9.2–11.8)
RBC # BLD AUTO: 3.28 M/UL (ref 4.2–5.3)
SPECIMEN EXP DATE BLD: NORMAL
STATUS OF UNIT,%ST: NORMAL
UNIT DIVISION, %UDIV: 0
WBC # BLD AUTO: 9.8 K/UL (ref 4.6–13.2)

## 2018-08-05 PROCEDURE — 65270000029 HC RM PRIVATE

## 2018-08-05 PROCEDURE — 74011250637 HC RX REV CODE- 250/637: Performed by: PHYSICIAN ASSISTANT

## 2018-08-05 PROCEDURE — 97116 GAIT TRAINING THERAPY: CPT

## 2018-08-05 PROCEDURE — 74011250637 HC RX REV CODE- 250/637: Performed by: ORTHOPAEDIC SURGERY

## 2018-08-05 PROCEDURE — 74011250636 HC RX REV CODE- 250/636: Performed by: ORTHOPAEDIC SURGERY

## 2018-08-05 PROCEDURE — 85025 COMPLETE CBC W/AUTO DIFF WBC: CPT | Performed by: ORTHOPAEDIC SURGERY

## 2018-08-05 PROCEDURE — 97530 THERAPEUTIC ACTIVITIES: CPT

## 2018-08-05 PROCEDURE — 36415 COLL VENOUS BLD VENIPUNCTURE: CPT | Performed by: ORTHOPAEDIC SURGERY

## 2018-08-05 PROCEDURE — 94640 AIRWAY INHALATION TREATMENT: CPT

## 2018-08-05 PROCEDURE — 74011000250 HC RX REV CODE- 250: Performed by: ORTHOPAEDIC SURGERY

## 2018-08-05 PROCEDURE — 73502 X-RAY EXAM HIP UNI 2-3 VIEWS: CPT

## 2018-08-05 PROCEDURE — 94760 N-INVAS EAR/PLS OXIMETRY 1: CPT

## 2018-08-05 RX ADMIN — IPRATROPIUM BROMIDE 0.5 MG: 0.5 SOLUTION RESPIRATORY (INHALATION) at 14:21

## 2018-08-05 RX ADMIN — OMEPRAZOLE 20 MG: 20 CAPSULE, DELAYED RELEASE ORAL at 09:58

## 2018-08-05 RX ADMIN — AMLODIPINE BESYLATE 5 MG: 5 TABLET ORAL at 22:52

## 2018-08-05 RX ADMIN — HYDROMORPHONE HYDROCHLORIDE 2 MG: 2 TABLET ORAL at 12:47

## 2018-08-05 RX ADMIN — LEVOTHYROXINE SODIUM 88 MCG: 88 TABLET ORAL at 06:32

## 2018-08-05 RX ADMIN — Medication 10 ML: at 22:53

## 2018-08-05 RX ADMIN — BUDESONIDE 500 MCG: 0.5 INHALANT RESPIRATORY (INHALATION) at 21:07

## 2018-08-05 RX ADMIN — HYDROMORPHONE HYDROCHLORIDE 2 MG: 2 TABLET ORAL at 05:43

## 2018-08-05 RX ADMIN — DOCUSATE SODIUM 100 MG: 100 CAPSULE, LIQUID FILLED ORAL at 22:52

## 2018-08-05 RX ADMIN — TRAMADOL HYDROCHLORIDE 50 MG: 50 TABLET, FILM COATED ORAL at 22:52

## 2018-08-05 RX ADMIN — BUDESONIDE 500 MCG: 0.5 INHALANT RESPIRATORY (INHALATION) at 07:31

## 2018-08-05 RX ADMIN — INDAPAMIDE 2.5 MG: 2.5 TABLET, FILM COATED ORAL at 12:47

## 2018-08-05 RX ADMIN — ENOXAPARIN SODIUM 40 MG: 40 INJECTION, SOLUTION INTRAVENOUS; SUBCUTANEOUS at 22:52

## 2018-08-05 RX ADMIN — BUPROPION HYDROCHLORIDE 200 MG: 100 TABLET, FILM COATED, EXTENDED RELEASE ORAL at 09:58

## 2018-08-05 RX ADMIN — RAMIPRIL 10 MG: 5 CAPSULE ORAL at 22:51

## 2018-08-05 RX ADMIN — TRAMADOL HYDROCHLORIDE 50 MG: 50 TABLET, FILM COATED ORAL at 02:52

## 2018-08-05 RX ADMIN — ACETAMINOPHEN 650 MG: 325 TABLET, FILM COATED ORAL at 02:52

## 2018-08-05 RX ADMIN — DULOXETINE HYDROCHLORIDE 60 MG: 60 CAPSULE, DELAYED RELEASE ORAL at 22:51

## 2018-08-05 RX ADMIN — DULOXETINE HYDROCHLORIDE 60 MG: 60 CAPSULE, DELAYED RELEASE ORAL at 09:57

## 2018-08-05 RX ADMIN — CYCLOSPORINE 1 DROP: 0.5 EMULSION OPHTHALMIC at 22:52

## 2018-08-05 RX ADMIN — DOCUSATE SODIUM 100 MG: 100 CAPSULE, LIQUID FILLED ORAL at 09:57

## 2018-08-05 RX ADMIN — TRAMADOL HYDROCHLORIDE 50 MG: 50 TABLET, FILM COATED ORAL at 08:00

## 2018-08-05 RX ADMIN — ACETAMINOPHEN 650 MG: 325 TABLET, FILM COATED ORAL at 14:32

## 2018-08-05 RX ADMIN — CLOPIDOGREL BISULFATE 75 MG: 75 TABLET ORAL at 09:58

## 2018-08-05 RX ADMIN — CARVEDILOL 6.25 MG: 12.5 TABLET, FILM COATED ORAL at 09:59

## 2018-08-05 RX ADMIN — IPRATROPIUM BROMIDE 0.5 MG: 0.5 SOLUTION RESPIRATORY (INHALATION) at 21:07

## 2018-08-05 RX ADMIN — ARFORMOTEROL TARTRATE 15 MCG: 15 SOLUTION RESPIRATORY (INHALATION) at 07:31

## 2018-08-05 RX ADMIN — ACETAMINOPHEN 650 MG: 325 TABLET, FILM COATED ORAL at 22:51

## 2018-08-05 RX ADMIN — CARVEDILOL 6.25 MG: 12.5 TABLET, FILM COATED ORAL at 17:46

## 2018-08-05 RX ADMIN — IPRATROPIUM BROMIDE 0.5 MG: 0.5 SOLUTION RESPIRATORY (INHALATION) at 07:31

## 2018-08-05 RX ADMIN — TRAMADOL HYDROCHLORIDE 50 MG: 50 TABLET, FILM COATED ORAL at 14:32

## 2018-08-05 RX ADMIN — ACETAMINOPHEN 650 MG: 325 TABLET, FILM COATED ORAL at 09:58

## 2018-08-05 RX ADMIN — BUPROPION HYDROCHLORIDE 200 MG: 100 TABLET, FILM COATED, EXTENDED RELEASE ORAL at 22:51

## 2018-08-05 RX ADMIN — HYDROMORPHONE HYDROCHLORIDE 2 MG: 2 TABLET ORAL at 17:46

## 2018-08-05 RX ADMIN — CYCLOSPORINE 1 DROP: 0.5 EMULSION OPHTHALMIC at 09:59

## 2018-08-05 RX ADMIN — PRAVASTATIN SODIUM 80 MG: 20 TABLET ORAL at 22:51

## 2018-08-05 RX ADMIN — Medication 10 ML: at 06:32

## 2018-08-05 RX ADMIN — ARFORMOTEROL TARTRATE 15 MCG: 15 SOLUTION RESPIRATORY (INHALATION) at 21:07

## 2018-08-05 NOTE — PROGRESS NOTES
Problem: Mobility Impaired (Adult and Pediatric)  Goal: *Acute Goals and Plan of Care (Insert Text)  Physical Therapy Goals   Initiated 8/3/2018 and to be accomplished within 3-5 day(s)  1. Patient will move from supine <> sit with S in prep for out of bed activity and change of position. 2.  Patient will perform sit<> stand with S with LRAD in prep for transfers/ambulation. 3.  Patient will transfer from bed <> chair with S with LRAD for time up in chair for completion of ADL activity. 4.  Patient will ambulate 150 feet with LRAD/S for improved functional mobility/safe discharge. 5.  Patient will ascend/descend 3-5 stairs with handrails with contact guard assist for home re-entry as needed. Outcome: Progressing Towards Goal  physical Therapy TREATMENT    Patient: Ryanne Chapman (54 y.o. female)  Date: 8/5/2018  Diagnosis: OSTEOARTHRITIS RIGHT HIP, HYPERTENSION, COPD, PREVIOUS TIA, OLD/PREVIOUS MI  Degenerative joint disease (DJD) of hip <principal problem not specified>  Procedure(s) (LRB):  RIGHT TOTAL HIP REPLACEMENT ANTERIOR APPROACH WITH C-ARM **SPEC POP**  (Right) 2 Days Post-Op  Precautions: Fall, WBAT   Chart, physical therapy assessment, plan of care and goals were reviewed. ASSESSMENT:  Pt reporting severe pain to RLE with movement. Pt requiring significant physical assistance as a result. Pt requesting to use bathroom urgently. Mod/Max A for transfer to [facility]. Extensive VCs for correct RW management and sequencing. Pt with increase anxiety due to high pain levels. Pt able to void urine. Attempted to increase ambulation distance, however pt unable due to increase fatigue and severe pain. Pt is crying in pain. Pt left sitting EOB with ice to R hip area. Pt also seems slightly confused as she thought food tray that was present, was from previous day. When in fact is was her lunch. Strongly recommend rehab placement and medical transport for max safety. Cont POC.     Progression toward goals:  [] Improving appropriately and progressing toward goals  [x]      Improving slowly and progressing toward goals  []      Not making progress toward goals and plan of care will be adjusted     PLAN:  Patient continues to benefit from skilled intervention to address the above impairments. Continue treatment per established plan of care. Discharge Recommendations:  Rehab  Further Equipment Recommendations for Discharge:  rolling walker     SUBJECTIVE:   Patient stated I just woke up.     OBJECTIVE DATA SUMMARY:   Critical Behavior:  Neurologic State: Alert, Appropriate for age, Eyes open spontaneously  Orientation Level: Appropriate for age, Oriented X4  Cognition: Appropriate decision making, Appropriate for age attention/concentration, Appropriate safety awareness, Follows commands  Functional Mobility Training:  Bed Mobility:  Supine to Sit: Moderate assistance  Sit to Supine: Moderate assistance  Transfers:  Sit to Stand: Moderate assistance; Adaptive equipment; Additional time  Stand to Sit: Moderate assistance; Adaptive equipment; Additional time  Balance:  Sitting: Intact  Standing: Impaired; With support  Standing - Static: Fair  Standing - Dynamic : Poor  Ambulation/Gait Training:  Distance (ft): 5 Feet (ft)  Assistive Device: Walker, rolling;Gait belt  Ambulation - Level of Assistance:  Moderate assistance  Gait Abnormalities: Antalgic;Decreased step clearance;Shuffling gait  Right Side Weight Bearing: As tolerated  Base of Support: Shift to left  Stance: Right decreased  Speed/Selena: Slow;Shuffled  Step Length: Right shortened;Left shortened    Pain:  Pain Scale 1: Numeric (0 - 10)  Pain Intensity 1: 5  Pain Location 1: Hip  Pain Orientation 1: Right  Pain Description 1: Aching  Pain Intervention(s) 1: Medication (see MAR)  Activity Tolerance:   Poor     After treatment:   [] Patient left in no apparent distress sitting up in chair  [x] Patient left in no apparent distress in bed(EOB)   [x] Call bell left within reach  [] Nursing notified  [] Caregiver present  [] Bed alarm activated      Lili Sarmiento PTA   Time Calculation: 26 mins

## 2018-08-05 NOTE — PROGRESS NOTES
-Assumed pt care. Received bedside and verbal SBAR from Dottie Calderon. Alert A&Ox4. RTH mepelex; clean, dry,and intact. Pt rates pain 6/10 to R hip. Ice placed on R hip. Pt voiding w/ no issues in Bedpan. Lungs; clear. Active bowel sounds x4. This RN's information written on white board. Pt informed of plan for day: pain control, x-ray, PT, and up w/ meals. Bed in lowest position. Call bell w/in reach. Will continue to monitor.  
 
-Pt son called and asked if pt was going to be discharged today. This RN stated there were no discharge orders in pt chart but he could check later to see if pt discharge status has changed. Pt son verbalized understanding. 0800 Administered scheduled Ultram for pain. 4357 Administered scheduled Tylenol for pain. 
 
-Dr Linnette Hurt ordered R hip X-ray. 80 Pt son called to see if mother was being discharged today. Informed pt there were no orders for discharge yet and he could call anytime to check back. Pt son verbalized understanding. 1247 Administered PRN  2 mg Dilaudid for pain. - X ray results: Findings: Total arthroplasty of the right hip. No evidence of hardware 
complication. Normal alignment. No fracture. Small amount of soft tissue gas 
noted. Bilateral iliac vascular stents. 
  
 
1432 Administered scheduled Tylenol and Ultram for pain. 1700 Informed by Opal Tabor PT; pt had difficult time w/ BSC, pt needs assist x 1-2, pt in a lot of pain when ambulating. 1746 Administered PRN 2 mg Dilaudid for pain. Pt voiding. Hard to get pain at tolerable level. Pt working w/ PT. Bedside and Verbal shift change report given to [address] (oncoming nurse) by Lena Vaz RN (offgoing nurse). Report included the following information SBAR, Kardex, ED Summary, OR Summary, Procedure Summary, Intake/Output, MAR, Accordion, Recent Results, Med Rec Status and Alarm Parameters . Alarm parameters reviewed, on and audible Appropriate for patient clinical condition.  
Alarm parameters reviewed and opportunities for questions provided.

## 2018-08-05 NOTE — ROUTINE PROCESS
Bedside and Verbal shift change report given to BASSEM Rose RN (oncoming nurse) by John Guan (offgoing nurse). Report included the following information SBAR, Kardex, Intake/Output, MAR and Recent Results.

## 2018-08-05 NOTE — PROGRESS NOTES
Problem: Mobility Impaired (Adult and Pediatric)  Goal: *Acute Goals and Plan of Care (Insert Text)  Physical Therapy Goals   Initiated 8/3/2018 and to be accomplished within 3-5 day(s)  1. Patient will move from supine <> sit with S in prep for out of bed activity and change of position. 2.  Patient will perform sit<> stand with S with LRAD in prep for transfers/ambulation. 3.  Patient will transfer from bed <> chair with S with LRAD for time up in chair for completion of ADL activity. 4.  Patient will ambulate 150 feet with LRAD/S for improved functional mobility/safe discharge. 5.  Patient will ascend/descend 3-5 stairs with handrails with contact guard assist for home re-entry as needed. Outcome: Progressing Towards Goal  physical Therapy TREATMENT    Patient: Yevgeniy Padilla (42 y.o. female)  Date: 8/5/2018  Diagnosis: OSTEOARTHRITIS RIGHT HIP, HYPERTENSION, COPD, PREVIOUS TIA, OLD/PREVIOUS MI  Degenerative joint disease (DJD) of hip <principal problem not specified>  Procedure(s) (LRB):  RIGHT TOTAL HIP REPLACEMENT ANTERIOR APPROACH WITH C-ARM **SPEC POP**  (Right) 2 Days Post-Op  Precautions: Fall, WBAT   Chart, physical therapy assessment, plan of care and goals were reviewed. ASSESSMENT:  Pt expressed need to use restroom. Pt agreeable to use BSC. Mod A needed for bed mobility due to increase pain with movement to RLE. Mod A for transfer with RW. VCs for correct hand placement and sequencing. Pt posterior leaning heavily, and Vc/tc for correct RW management and sequencing. Pt able to void urine. Pt continues to present with antalgic shuffle gait due to high pain level. Pt tearful. Would recommend bed pan for nursing staff. Nursing aware of above. Cont POC.     Progression toward goals:  []      Improving appropriately and progressing toward goals  [x]      Improving slowly and progressing toward goals  []      Not making progress toward goals and plan of care will be adjusted     PLAN:  Patient continues to benefit from skilled intervention to address the above impairments. Continue treatment per established plan of care. Discharge Recommendations:  Rehab  Further Equipment Recommendations for Discharge:  rolling walker     SUBJECTIVE:   Patient stated  Can you help me     OBJECTIVE DATA SUMMARY:   Critical Behavior:  Neurologic State: Alert, Appropriate for age, Eyes open spontaneously  Orientation Level: Appropriate for age, Oriented X4  Cognition: Appropriate decision making, Appropriate for age attention/concentration, Appropriate safety awareness, Follows commands  Functional Mobility Training:  Bed Mobility:  Supine to Sit: Moderate assistance  Sit to Supine: Moderate assistance  Transfers:  Sit to Stand: Moderate assistance; Adaptive equipment; Additional time  Stand to Sit: Moderate assistance; Additional time; Adaptive equipment  Balance:  Sitting: Intact; With support  Standing: Impaired; With support  Standing - Static: Fair ([address]-)  Standing - Dynamic : Poor  Ambulation/Gait Training:  Distance (ft): 5 Feet (ft)  Assistive Device: Walker, rolling;Gait belt  Ambulation - Level of Assistance:  Moderate assistance  Gait Abnormalities: Antalgic;Decreased step clearance;Shuffling gait  Right Side Weight Bearing: As tolerated  Base of Support: Shift to left  Stance: Right decreased  Speed/Selena: Slow;Shuffled  Step Length: Left shortened;Right shortened    Pain:  Pain Scale 1: Numeric (0 - 10)  Pain Intensity 1: 6  Pain Location 1: Hip  Pain Orientation 1: Right  Pain Description 1: Aching  Pain Intervention(s) 1: Medication (see MAR)  Activity Tolerance:   Fair   After treatment:   [] Patient left in no apparent distress sitting up in chair  [x] Patient left in no apparent distress in bed  [x] Call bell left within reach  [x] Nursing notified  [] Caregiver present  [] Bed alarm activated      Curtis Marti PTA   Time Calculation: 38 mins

## 2018-08-05 NOTE — PROGRESS NOTES
Problem: Falls - Risk of 
Goal: *Absence of Falls Document Clide Failing Fall Risk and appropriate interventions in the flowsheet. Outcome: Progressing Towards Goal 
Fall Risk Interventions: 
Mobility Interventions: Utilize walker, cane, or other assistive device, Patient to call before getting OOB Mentation Interventions: Adequate sleep, hydration, pain control, Increase mobility, Update white board Medication Interventions: Patient to call before getting OOB, Teach patient to arise slowly Elimination Interventions: Elevated toilet seat, Call light in reach, Toileting schedule/hourly rounds

## 2018-08-06 VITALS
OXYGEN SATURATION: 94 % | DIASTOLIC BLOOD PRESSURE: 61 MMHG | SYSTOLIC BLOOD PRESSURE: 110 MMHG | TEMPERATURE: 98.5 F | WEIGHT: 135.58 LBS | HEART RATE: 56 BPM | RESPIRATION RATE: 16 BRPM | HEIGHT: 60 IN | BODY MASS INDEX: 26.62 KG/M2

## 2018-08-06 PROBLEM — M16.9 DEGENERATIVE JOINT DISEASE (DJD) OF HIP: Status: RESOLVED | Noted: 2018-08-03 | Resolved: 2018-08-06

## 2018-08-06 PROCEDURE — 94640 AIRWAY INHALATION TREATMENT: CPT

## 2018-08-06 PROCEDURE — 74011000250 HC RX REV CODE- 250: Performed by: ORTHOPAEDIC SURGERY

## 2018-08-06 PROCEDURE — 94760 N-INVAS EAR/PLS OXIMETRY 1: CPT

## 2018-08-06 PROCEDURE — 97110 THERAPEUTIC EXERCISES: CPT

## 2018-08-06 PROCEDURE — 77030037875 HC DRSG MEPILEX <16IN BORD MOLN -A

## 2018-08-06 PROCEDURE — 74011250637 HC RX REV CODE- 250/637: Performed by: ORTHOPAEDIC SURGERY

## 2018-08-06 PROCEDURE — 97116 GAIT TRAINING THERAPY: CPT

## 2018-08-06 PROCEDURE — 74011250637 HC RX REV CODE- 250/637: Performed by: PHYSICIAN ASSISTANT

## 2018-08-06 RX ORDER — ACETAMINOPHEN 325 MG/1
650 TABLET ORAL 4 TIMES DAILY
Qty: 120 TAB | Refills: 0 | Status: ON HOLD | OUTPATIENT
Start: 2018-08-06 | End: 2019-01-15

## 2018-08-06 RX ORDER — TRAMADOL HYDROCHLORIDE 50 MG/1
50 TABLET ORAL 4 TIMES DAILY
Qty: 60 TAB | Refills: 0 | Status: SHIPPED | OUTPATIENT
Start: 2018-08-06 | End: 2018-12-12

## 2018-08-06 RX ORDER — HYDROMORPHONE HYDROCHLORIDE 2 MG/1
2 TABLET ORAL
Qty: 40 TAB | Refills: 0 | Status: SHIPPED | OUTPATIENT
Start: 2018-08-06 | End: 2018-12-12

## 2018-08-06 RX ADMIN — ACETAMINOPHEN 650 MG: 325 TABLET, FILM COATED ORAL at 09:31

## 2018-08-06 RX ADMIN — TRAMADOL HYDROCHLORIDE 50 MG: 50 TABLET, FILM COATED ORAL at 09:32

## 2018-08-06 RX ADMIN — FLUOROMETHOLONE 1 DROP: 1 SOLUTION/ DROPS OPHTHALMIC at 01:46

## 2018-08-06 RX ADMIN — BUDESONIDE 500 MCG: 0.5 INHALANT RESPIRATORY (INHALATION) at 07:31

## 2018-08-06 RX ADMIN — OMEPRAZOLE 20 MG: 20 CAPSULE, DELAYED RELEASE ORAL at 09:32

## 2018-08-06 RX ADMIN — HYDROMORPHONE HYDROCHLORIDE 2 MG: 2 TABLET ORAL at 06:22

## 2018-08-06 RX ADMIN — CYCLOSPORINE 1 DROP: 0.5 EMULSION OPHTHALMIC at 09:32

## 2018-08-06 RX ADMIN — CLOPIDOGREL BISULFATE 75 MG: 75 TABLET ORAL at 09:32

## 2018-08-06 RX ADMIN — DULOXETINE HYDROCHLORIDE 60 MG: 60 CAPSULE, DELAYED RELEASE ORAL at 09:31

## 2018-08-06 RX ADMIN — IPRATROPIUM BROMIDE 0.5 MG: 0.5 SOLUTION RESPIRATORY (INHALATION) at 14:41

## 2018-08-06 RX ADMIN — BUPROPION HYDROCHLORIDE 200 MG: 100 TABLET, FILM COATED, EXTENDED RELEASE ORAL at 09:31

## 2018-08-06 RX ADMIN — ARFORMOTEROL TARTRATE 15 MCG: 15 SOLUTION RESPIRATORY (INHALATION) at 07:31

## 2018-08-06 RX ADMIN — INDAPAMIDE 2.5 MG: 2.5 TABLET, FILM COATED ORAL at 09:32

## 2018-08-06 RX ADMIN — TRAMADOL HYDROCHLORIDE 50 MG: 50 TABLET, FILM COATED ORAL at 14:10

## 2018-08-06 RX ADMIN — ACETAMINOPHEN 650 MG: 325 TABLET, FILM COATED ORAL at 14:10

## 2018-08-06 RX ADMIN — LATANOPROST 1 DROP: 50 SOLUTION OPHTHALMIC at 01:47

## 2018-08-06 RX ADMIN — TRAMADOL HYDROCHLORIDE 50 MG: 50 TABLET, FILM COATED ORAL at 02:55

## 2018-08-06 RX ADMIN — ACETAMINOPHEN 650 MG: 325 TABLET, FILM COATED ORAL at 02:55

## 2018-08-06 RX ADMIN — CARVEDILOL 6.25 MG: 12.5 TABLET, FILM COATED ORAL at 09:31

## 2018-08-06 RX ADMIN — IPRATROPIUM BROMIDE 0.5 MG: 0.5 SOLUTION RESPIRATORY (INHALATION) at 07:31

## 2018-08-06 RX ADMIN — DOCUSATE SODIUM 100 MG: 100 CAPSULE, LIQUID FILLED ORAL at 09:32

## 2018-08-06 RX ADMIN — LEVOTHYROXINE SODIUM 88 MCG: 88 TABLET ORAL at 06:31

## 2018-08-06 NOTE — ROUTINE PROCESS
TRANSFER - OUT REPORT:    Verbal report given to [facility] . Janay COY (name) on Barry Garcia  being transferred to [facility] for routine post - op       Report consisted of patients Situation, Background, Assessment and   Recommendations(SBAR). Information from the following report(s) SBAR, Kardex, Procedure Summary, Intake/Output and MAR was reviewed with the receiving nurse. Opportunity for questions and clarification was provided. Patient transported with: SNF short form, ED face sheet and prescription.

## 2018-08-06 NOTE — ROUTINE PROCESS
Bedside and Verbal shift change report given to Khadijah Gallegos RN (oncoming nurse) by Gisel Ortiz RN   (offgoing nurse). Report included the following information SBAR, Kardex, OR Summary, Intake/Output, MAR and Recent Results.

## 2018-08-06 NOTE — CDMP QUERY
Please clarify if this patient is being treated/managed for:    =>Acute Blood Loss Anemia s/p right total hip replacement  =>Other Explanation of clinical findings  =>Unable to Determine (no explanation of clinical findings)    The medical record reflects the following:    Risk: Elderly patient s/p right total hip replacement    Clinical Indicators: Preop H/H 11.7/35.3  Post op 8/4 H/H 7.7/23.9  s/p transfusion up to 9.3/27.8    Treatment: transfusion of 1 unit PRBC's     Please clarify and document your clinical opinion in the progress notes and discharge summary including the definitive and/or presumptive diagnosis, (suspected or probable), related to the above clinical findings. Please include clinical findings supporting your diagnosis. If you DECLINE this query or would like to communicate with [facility], please utilize the \"[facility] message box\" at the TOP of the Progress Note on the right.       Thank you,    Jono Oliveira RN, CCDS

## 2018-08-06 NOTE — PROGRESS NOTES
Plan: pt has been accepted for admission today 8/6 to [facility] at [address]. [phone] for report. Please include all hard scripts for controlled substances, med rec and dc summary in packet. Please medicate for pain prior to dc if possible and needed to help offset delay when patient first arrives to facility. Pt will need medical transportation for safety to SNF, spoke with yoly Harris, he is aware pt may incur cost for transport. CM will contact son when pt discharged. Chart reviewed, noted PT recs for rehab. Met with pt in room during PT session. FOC offered, [facility] follow up; referrals placed with CMS. Pt gave permission to contact yoly Harris- [address]- VM left re- discharge plan. Pt a little confused, CM explained to her several times inpt rehab vs , pt unsure which son visited her this morning. CM will need to contact son prior to pt transitioning to SNF if pt confused. At this time [address] able to assist with rehab bed, pt will need medical transport for safety. Will follow up with pt and son if RW delivered over weekend by [facility]. 1345- transport scheduled for \"within the hour\" per [facility] dispatch, yoly Harris contacted and made aware. Also, pt does not qualify for Medicare to cover RW until 2019; son made aware and states pt has 2 walkers at home. [facility] aware of transport scheduled. Care Management Interventions  PCP Verified by CM:  Yes  Transition of Care Consult (CM Consult): SNF  [facility] - IN[facility]IENT: No  Reason Outside [place]: Physician referred to specific agency ([facility])  Partner SNF: Yes  Discharge Durable Medical Equipment: Yes  Physical Therapy Consult: Yes  Occupational Therapy Consult: Yes  Current Support Network: Own Home  Confirm Follow Up Transport: Family  Plan discussed with Pt/Family/Caregiver: Yes  Freedom of Choice Offered: Yes  Discharge Location  Discharge Placement: Skilled nursing facility

## 2018-08-06 NOTE — PROGRESS NOTES
Pt resting in bed, external urinary cath in place for urge incontinence and difficulty turning. Up with assist and PT. Mepilex to R hip, swelling and redness noted - ice applied to site     0620 Pt confused as to time, forgetful at times - kept on asking for bedpan to void. Bath given and bed linens changed this am. Noticeable tremors noted on arms and hands, ongoing as received from outgoing nurse's report. Mepilex to R hip D/I, ice pack to same area for swelling and comfort.  PT recommends Rehab placement for pt

## 2018-08-06 NOTE — DISCHARGE SUMMARY
Discharge Summary    Patient: Berry Galvan               Sex: female          DOA: 8/3/2018         YOB: 1940      Age:  78 y.o.        LOS:  LOS: 3 days                Admit Date: 8/3/2018    Discharge Date: 8/6/2018    Admission Diagnoses: OSTEOARTHRITIS RIGHT HIP, HYPERTENSION, COPD, PREVIOUS TIA, OLD/PREVIOUS MI  Degenerative joint disease (DJD) of hip    Discharge Diagnoses:    Problem List as of 8/6/2018  Date Reviewed: 1/31/2018          Codes Class Noted - Resolved    Leg pain, bilateral ICD-10-CM: M79.604, M79.605  ICD-9-CM: 729.5  1/18/2017 - Present        Aortic ectasia, abdominal ([facility] ICD-10-CM: K16.955  ICD-9-CM: 447.72  6/17/2016 - Present        Diarrhea ICD-10-CM: R19.7  ICD-9-CM: 787.91  4/13/2014 - Present        Dizziness ICD-10-CM: R42  ICD-9-CM: 780.4  4/13/2014 - Present        Azotemia ICD-10-CM: R79.89  ICD-9-CM: 790.6  4/13/2014 - Present        TIA (transient ischemic attack) ICD-10-CM: G45.9  ICD-9-CM: 435.9  1/14/2014 - Present        HTN (hypertension) ICD-10-CM: I10  ICD-9-CM: 401.9  1/14/2014 - Present        Coronary atherosclerosis of native coronary artery ICD-10-CM: I25.10  ICD-9-CM: 414.01  1/14/2014 - Present        Chronic airway obstruction, not elsewhere classified ICD-10-CM: J44.9  ICD-9-CM: 312  1/14/2014 - Present        Peripheral arterial disease ([facility] ICD-10-CM: I73.9  ICD-9-CM: 443.9  1/14/2014 - Present        SENIA (stress urinary incontinence, female) ICD-10-CM: N39.3  ICD-9-CM: 625.6  11/13/2012 - Present        Intrinsic sphincter deficiency (ISD) ICD-10-CM: N36.42  ICD-9-CM: 599.82  11/13/2012 - Present        RESOLVED: Degenerative joint disease (DJD) of hip ICD-10-CM: M16.9  ICD-9-CM: 715.95  8/3/2018 - 8/6/2018        RESOLVED: Altered mental status: Resolved ICD-10-CM: R41.82  ICD-9-CM: 780.97  1/14/2014 - 4/13/2014    Overview Signed 1/21/2014  8:48 AM by Kary Silverman MD     Likely from UTI                   Discharge Condition: Good    Hospital Course: THR, Acute blood loss anemia    Consults: Hospitalist    Significant Diagnostic Studies: none    Discharge Medications:     Current Discharge Medication List      CONTINUE these medications which have NOT CHANGED    Details   buPROPion SR (WELLBUTRIN SR) 100 mg SR tablet Take 200 mg by mouth two (2) times a day. budesonide (PULMICORT) 0.5 mg/2 mL nbsp 500 mcg by Nebulization route two (2) times a day. Indications: Severe Chronic Obstructive Pulmonary Disease      ipratropium (ATROVENT) 0.02 % soln 0.5 mg by Nebulization route three (3) times daily. latanoprost (XALATAN) 0.005 % ophthalmic solution Administer 1 Drop to both eyes daily. estradiol (ESTRACE) 0.01 % (0.1 mg/gram) vaginal cream Insert 2 g into vagina daily as needed. amLODIPine (NORVASC) 5 mg tablet Take 5 mg by mouth nightly. Indications: hypertension      DULoxetine (CYMBALTA) 60 mg capsule Take 60 mg by mouth two (2) times a day. carvedilol (COREG) 6.25 mg tablet Take 6.25 mg by mouth two (2) times daily (with meals). indapamide (LOZOL) 2.5 mg tablet Take  by mouth daily. Indications: hypertension      OTHER HOME OXYGEN  Qty: 1 Int'l Units, Refills: 0      Omeprazole delayed release (PRILOSEC D/R) 20 mg tablet Take 20 mg by mouth daily. fluorometholone (FML LIQUIFILM) 0.1 % ophthalmic suspension Administer 1 Drop to both eyes nightly. Indications: Dry eyes      ramipril (ALTACE) 10 mg capsule Take 10 mg by mouth nightly. Indications: hypertension      cycloSPORINE (RESTASIS) 0.05 % ophthalmic emulsion Administer 1 Drop to both eyes two (2) times a day. Indications: DRY EYE      Arformoterol (BROVANA) 15 mcg/2 mL Nebu neb solution 15 mcg by Nebulization route two (2) times a day. Indications: COPD ASSOCIATED WITH CHRONIC BRONCHITIS      pravastatin (PRAVACHOL) 40 mg tablet Take 80 mg by mouth nightly.  Indications: HYPERCHOLESTEROLEMIA      levothyroxine (SYNTHROID) 88 mcg tablet Take 88 mcg by mouth Daily (before breakfast). Indications: hypothyroidism      aspirin 81 mg chewable tablet Take 1 Tab by mouth daily. Qty: 30 Tab, Refills: 0      clopidogrel (PLAVIX) 75 mg tablet Take 75 mg by mouth daily.  Indications: Cardiac stent             Activity: Activity as tolerated    Diet: Regular Diet    Wound Care: Keep wound clean and dry    Follow-up: 2 weeks

## 2018-08-06 NOTE — PROGRESS NOTES
6029: Received report from [address]. Assumed pt care at this time. 4276: Assessment completed. Patient is A&OX4, periodic confuse. Patient is calm and cooperative. Family at bedside. Patient's oral mucosa pink and moist, strong  on both upper extremities. Had denture with her. Lung sound clear, not appear distress. Bowel sounds active. Pedal pulses are palpable, no complain of any numbness or tingling. IV site dry and dressing intact. Mepilex dressing to right hip had old driange, Ice is applied. SCD are applied. State no Pain. bed is in lower position, call bell and personal items are within reach. Encourage pt to perform foot exercise. Pain regimen and activities are educated, educated pt to call when getting up to prevent fall. Pt verbalized understanding. 1514: Spoke with Michael CERDA . Pt is not qualified to have another walker, she said she spoke with pt's son. He Pt is currently transfer the the rehab.

## 2018-08-06 NOTE — PROGRESS NOTES
Problem: Mobility Impaired (Adult and Pediatric)  Goal: *Acute Goals and Plan of Care (Insert Text)  Physical Therapy Goals   Initiated 8/3/2018 and to be accomplished within 3-5 day(s)  1. Patient will move from supine <> sit with S in prep for out of bed activity and change of position. 2.  Patient will perform sit<> stand with S with LRAD in prep for transfers/ambulation. 3.  Patient will transfer from bed <> chair with S with LRAD for time up in chair for completion of ADL activity. 4.  Patient will ambulate 150 feet with LRAD/S for improved functional mobility/safe discharge. 5.  Patient will ascend/descend 3-5 stairs with handrails with contact guard assist for home re-entry as needed. Outcome: Progressing Towards Goal  physical Therapy TREATMENT    Patient: Abdoulaye Lee (3 y.o. female)  Date: 8/6/2018  Diagnosis: OSTEOARTHRITIS RIGHT HIP, HYPERTENSION, COPD, PREVIOUS TIA, OLD/PREVIOUS MI  Degenerative joint disease (DJD) of hip <principal problem not specified>  Procedure(s) (LRB):  RIGHT TOTAL HIP REPLACEMENT ANTERIOR APPROACH WITH C-ARM **SPEC POP**  (Right) 3 Days Post-Op  Precautions: Fall, WBAT  Chart, physical therapy assessment, plan of care and goals were reviewed. ASSESSMENT:  Pt cont to c/o R hip pain 7/10 on numerical pain scale. Pt able to participate in therapeutic exercises, transfer training requiring less A this session and limited gt training due to pain. Pt is unable to WS off R LE in effort to increase gt tolerance as pt verbally acknowledges instruction but unable to demonstrate. Dec motor processing noted w/ mobility and instruction. Pt is unsafe during gt training as pt becomes anxious and wanting to prematurely sit requiring mod A. Pt able to participate in sit<>stand x6 trials and dec A noted w/ subsequent trials. Pt still becomes anxious w/ activity. Pt left supine in bed w/ all needs within reach, [facility] [place] aware, ice packs to R hip and SCDs reapplied. Recommend SNF rehab upon hospital d/c. Progression toward goals:  []      Improving appropriately and progressing toward goals  [x]      Improving slowly and progressing toward goals  []      Not making progress toward goals and plan of care will be adjusted     PLAN:  Patient continues to benefit from skilled intervention to address the above impairments. Continue treatment per established plan of care. Discharge Recommendations:  Rehab  Further Equipment Recommendations for Discharge:  N/A     SUBJECTIVE:   Patient stated I don't know what to do. I am still having a lot of pain.     OBJECTIVE DATA SUMMARY:   Critical Behavior:  Neurologic State: Alert, Appropriate for age  Orientation Level: Oriented X4  Cognition: Appropriate decision making, Appropriate for age attention/concentration, Appropriate safety awareness, Follows commands  Functional Mobility Training:  Bed Mobility:  Supine to Sit: Minimum assistance;Contact guard assistance; Additional time (vc)  Sit to Supine: Minimum assistance; Additional time (vc)  Scooting: Minimum assistance; Additional time (vc)  Transfers:  Sit to Stand: Moderate assistance;Minimum assistance; Additional time (vc)  Stand to Sit: Minimum assistance; Additional time (vc)  Interventions: Safety awareness training; Tactile cues; Verbal cues  Balance:  Sitting: Intact  Standing: Impaired; With support  Standing - Static: Fair  Standing - Dynamic : Poor   Range Of Motion:  Ambulation/Gait Training:  Distance (ft): 3 Feet (ft)  Assistive Device: Gait belt;Walker, rolling  Ambulation - Level of Assistance: Moderate assistance (vc)  Gait Abnormalities: Antalgic;Decreased step clearance;Shuffling gait  Right Side Weight Bearing: As tolerated  Base of Support: Shift to left; Widened  Stance: Right decreased  Speed/Selena: Slow;Shuffled  Step Length: Left shortened;Right shortened  Swing Pattern: Left asymmetrical;Right asymmetrical  Neuro Re-Education:  Therapeutic Exercises:   Performed B ankle pumps, B ankle circles, quad sets, LAQ, and heelslides x10 reps R. Pain:  Pain Scale 1: Numeric (0 - 10)  Pain Intensity 1: 7  Pain Location 1: Hip  Pain Orientation 1: Right  Pain Description 1: Stabbing;Burning  Pain Intervention(s) 1: Medication (see MAR)  Activity Tolerance:   Fair-  Please refer to the flowsheet for vital signs taken during this treatment.   After treatment:   [] Patient left in no apparent distress sitting up in chair  [x] Patient left in no apparent distress in bed  [x] Call bell left within reach  [x] Nursing notified  [] Caregiver present  [] Bed alarm activated      Mack Epley, PT   Time Calculation: 30 mins

## 2018-08-07 NOTE — DISCHARGE SUMMARY
[address]    [address]  MR#: [id number]  : 1940  ACCOUNT #: [de-identified]   ADMIT DATE: 2018  DISCHARGE DATE: 2018    She will be going to [facility] today. CHIEF COMPLAINT:  Right hip pain. HISTORY:  A 57-year-old female admitted for right hip arthritis. HOSPITAL COURSE:  The patient was admitted, underwent a right hip replacement. Postop day 1, she had some anemia. Hemoglobin was 7.7, received 1 unit of packed red blood cells bringing her hemoglobin up to 9+. She has progressed slowly and therapy, continues to have quite a bit of hip pain. The patient at this point still requires skilled care and at this point will be transferred for further care and convalescence. FINAL DIAGNOSES:  1. Right hip degenerative joint disease. 2.  Acute blood loss anemia. 3.  Coronary artery disease. 4.  Peripheral arterial disease. 5.  Hypertension. PROCEDURES:  1. Right hip replacement. 2.  Transfusion 1 unit of packed red blood cells. DISPOSITION:  The patient will be transferred to [facility]. ACTIVITY:  Weightbearing as tolerated with a walker. She has staples on her incision. Should receive dressing changes as needed. MEDICATIONS:  See the med rec sheet and attached prescriptions. DIET:  Regular. FOLLOWUP:  Will be in the office at the 2 week dejan.       MD MERLYN Sánchez/DONNA  D: 2018 12:59     T: 2018 11:15  JOB #: [id number]

## 2018-08-12 ENCOUNTER — HOSPITAL ENCOUNTER (OUTPATIENT)
Dept: LAB | Age: 78
Discharge: HOME | End: 2018-08-12

## 2018-08-12 LAB
APPEARANCE UR: CLEAR
BACTERIA URNS QL MICRO: ABNORMAL /HPF
BILIRUB UR QL: NEGATIVE
COLOR UR: YELLOW
EPITH CASTS URNS QL MICRO: ABNORMAL /LPF (ref 0–5)
GLUCOSE UR STRIP.AUTO-MCNC: NEGATIVE MG/DL
HGB UR QL STRIP: NEGATIVE
HYALINE CASTS URNS QL MICRO: ABNORMAL /LPF (ref 0–2)
KETONES UR QL STRIP.AUTO: NEGATIVE MG/DL
LEUKOCYTE ESTERASE UR QL STRIP.AUTO: ABNORMAL
MUCOUS THREADS URNS QL MICRO: ABNORMAL /LPF
NITRITE UR QL STRIP.AUTO: NEGATIVE
PH UR STRIP: 5.5 [PH] (ref 5–8)
PROT UR STRIP-MCNC: NEGATIVE MG/DL
RBC #/AREA URNS HPF: ABNORMAL /HPF (ref 0–5)
SP GR UR REFRACTOMETRY: 1.02 (ref 1–1.03)
UROBILINOGEN UR QL STRIP.AUTO: 1 EU/DL (ref 0.2–1)
WBC URNS QL MICRO: ABNORMAL /HPF (ref 0–5)

## 2018-08-12 PROCEDURE — 81001 URINALYSIS AUTO W/SCOPE: CPT | Performed by: NURSE PRACTITIONER

## 2018-08-12 PROCEDURE — 87086 URINE CULTURE/COLONY COUNT: CPT | Performed by: NURSE PRACTITIONER

## 2018-08-14 LAB
BACTERIA SPEC CULT: NORMAL
SERVICE CMNT-IMP: NORMAL

## 2018-08-17 ENCOUNTER — PATIENT OUTREACH (OUTPATIENT)
Dept: CASE MANAGEMENT | Age: 78
End: 2018-08-17

## 2018-08-20 ENCOUNTER — HOSPITAL ENCOUNTER (OUTPATIENT)
Dept: MRI IMAGING | Age: 78
Discharge: HOME | End: 2018-08-20
Attending: NURSE PRACTITIONER
Payer: MEDICARE

## 2018-08-20 DIAGNOSIS — L03.90 CELLULITIS: ICD-10-CM

## 2018-08-20 PROCEDURE — 73721 MRI JNT OF LWR EXTRE W/O DYE: CPT

## 2018-08-22 ENCOUNTER — PATIENT OUTREACH (OUTPATIENT)
Dept: CASE MANAGEMENT | Age: 78
End: 2018-08-22

## 2018-08-22 NOTE — PROGRESS NOTES
Community Care Team Documentation for Patient in [facility]     Patient discharged from THE [facility] 8/3/2018 - 8/6/2018 to [facility], on 8/6/2018. Hospital Discharge diagnosis:  Right hip replacement. SNF Attending Provider:      Anticipated discharge date from SNF:  8/23/2018      PCP : Tomy Black MD    Nurse Navigator:     [facility] team updates provided by facility via email:     1. Attending assigned to patient Dr. Nikki Zee  2. Anticipated length of stay 8/22/2018  3. Anticipated discharge date 8/23/2018 D/C home with son  4. Anticipated disposition at discharge Ambulation with walker  5. Advanced medical directives yes  6. Any medication changes made since admission no  7. PT/OT/SP progress well  8. Anticipated barriers to discharge None               9. DME needed at discharge None  10. PCP follow-up appointment To be made after     Low Risk            11       Total Score        3 Has Seen PCP in Last 6 Months (Yes=3, No=0)    8 Charlson Comorbidity Score (Age + Comorbid Conditions)        Criteria that do not apply:    . Living with Significant Other. Assisted Living. LTAC. SNF. or   Rehab    Patient Length of Stay (>5 days = 3)    IP Visits Last 12 Months (1-3=4, 4=9, >4=11)    Pt.  Coverage (Medicare=5 , Medicaid, or Self-Pay=4)      Active Ambulatory Problems     Diagnosis Date Noted    SENIA (stress urinary incontinence, female) 11/13/2012    Intrinsic sphincter deficiency (ISD) 11/13/2012    TIA (transient ischemic attack) 01/14/2014    HTN (hypertension) 01/14/2014    Coronary atherosclerosis of native coronary artery 01/14/2014    Chronic airway obstruction, not elsewhere classified 01/14/2014    Peripheral arterial disease ([facility] 01/14/2014    Diarrhea 04/13/2014    Dizziness 04/13/2014    Azotemia 04/13/2014    Aortic ectasia, abdominal ([address] 06/17/2016    Leg pain, bilateral 01/18/2017     Resolved Ambulatory Problems     Diagnosis Date Noted    Altered mental status: Resolved 01/14/2014    Degenerative joint disease (DJD) of hip 08/03/2018     Past Medical History:   Diagnosis Date    Arthritis     CAD (coronary artery disease)     Calculus of kidney     Chronic pain     COPD     Depression     Dry eyes     GERD (gastroesophageal reflux disease)     Glaucoma     Heart attack ([facility] 2005    Hypercholesterolemia     Hypertension     Other ill-defined conditions(799.89)     Other ill-defined conditions(799.89)     Other ill-defined conditions(799.89) 1941    Psychiatric disorder     Stroke ([facility]     Thyroid disease

## 2018-08-22 NOTE — PROGRESS NOTES
Community Care Team Documentation for Patient in [facility]     Patient discharged from THE [facility] 8/3/2018 - 8/6/2018 to [facility], on 8/6/2018. Hospital Discharge diagnosis:  Right hip replacement. SNF Attending Provider:      Anticipated discharge date from SNF:  8/30/2018      PCP : Aravind Bradshaw MD    Nurse Navigator:     [facility] team rounds completed, updates provided by facility. WBAT. f/u with ortho 8/23  Dispo:Lives alone, family in area. Family provides transportation  Full Code  Dc 8/30 [facility]  Started on abx last week for wound infection, much improved. Low Risk            11       Total Score        3 Has Seen PCP in Last 6 Months (Yes=3, No=0)    8 Charlson Comorbidity Score (Age + Comorbid Conditions)        Criteria that do not apply:    . Living with Significant Other. Assisted Living. LTAC. SNF. or   Rehab    Patient Length of Stay (>5 days = 3)    IP Visits Last 12 Months (1-3=4, 4=9, >4=11)    Pt.  Coverage (Medicare=5 , Medicaid, or Self-Pay=4)      Active Ambulatory Problems     Diagnosis Date Noted    SENIA (stress urinary incontinence, female) 11/13/2012    Intrinsic sphincter deficiency (ISD) 11/13/2012    TIA (transient ischemic attack) 01/14/2014    HTN (hypertension) 01/14/2014    Coronary atherosclerosis of native coronary artery 01/14/2014    Chronic airway obstruction, not elsewhere classified 01/14/2014    Peripheral arterial disease ([address] 01/14/2014    Diarrhea 04/13/2014    Dizziness 04/13/2014    Azotemia 04/13/2014    Aortic ectasia, abdominal ([address] 06/17/2016    Leg pain, bilateral 01/18/2017     Resolved Ambulatory Problems     Diagnosis Date Noted    Altered mental status: Resolved 01/14/2014    Degenerative joint disease (DJD) of hip 08/03/2018     Past Medical History:   Diagnosis Date    Arthritis     CAD (coronary artery disease)     Calculus of kidney     Chronic pain     COPD     Depression     Dry eyes     GERD (gastroesophageal reflux disease)     Glaucoma     [facility]    Hypercholesterolemia     Hypertension     Other ill-defined conditions(799.89)     Other ill-defined conditions(799.89)     Other ill-defined conditions(799.89) 1941    Psychiatric disorder     Stroke ([facility]     Thyroid disease

## 2018-08-29 ENCOUNTER — PATIENT OUTREACH (OUTPATIENT)
Dept: CASE MANAGEMENT | Age: 78
End: 2018-08-29

## 2018-08-29 ENCOUNTER — HOSPITAL ENCOUNTER (OUTPATIENT)
Dept: LAB | Age: 78
Discharge: HOME | End: 2018-08-29
Payer: MEDICARE

## 2018-08-29 LAB
BASOPHILS # BLD: 0.1 K/UL (ref 0–0.1)
BASOPHILS NFR BLD: 2 % (ref 0–2)
DIFFERENTIAL METHOD BLD: ABNORMAL
EOSINOPHIL # BLD: 0.5 K/UL (ref 0–0.4)
EOSINOPHIL NFR BLD: 6 % (ref 0–5)
ERYTHROCYTE [DISTWIDTH] IN BLOOD BY AUTOMATED COUNT: 13.2 % (ref 11.6–14.5)
HCT VFR BLD AUTO: 36.1 % (ref 35–45)
HGB BLD-MCNC: 11.9 G/DL (ref 12–16)
LYMPHOCYTES # BLD: 1.6 K/UL (ref 0.9–3.6)
LYMPHOCYTES NFR BLD: 20 % (ref 21–52)
MCH RBC QN AUTO: 29.5 PG (ref 24–34)
MCHC RBC AUTO-ENTMCNC: 33 G/DL (ref 31–37)
MCV RBC AUTO: 89.6 FL (ref 74–97)
MONOCYTES # BLD: 0.9 K/UL (ref 0.05–1.2)
MONOCYTES NFR BLD: 11 % (ref 3–10)
NEUTS SEG # BLD: 4.6 K/UL (ref 1.8–8)
NEUTS SEG NFR BLD: 61 % (ref 40–73)
PLATELET # BLD AUTO: 344 K/UL (ref 135–420)
PMV BLD AUTO: 9.3 FL (ref 9.2–11.8)
RBC # BLD AUTO: 4.03 M/UL (ref 4.2–5.3)
WBC # BLD AUTO: 7.6 K/UL (ref 4.6–13.2)

## 2018-08-29 PROCEDURE — 85025 COMPLETE CBC W/AUTO DIFF WBC: CPT | Performed by: INTERNAL MEDICINE

## 2018-08-29 NOTE — PROGRESS NOTES
Community Care Team Documentation for Patient in [facility]     Patient discharged from [facility] 8/3/2018 - 8/6/2018 to [facility], on 8/6/2018. Hospital Discharge diagnosis:  Right hip replacement. SNF Attending Provider:      Anticipated discharge date from SNF:  8/30/2018      PCP : Aravind Bradshaw MD    Nurse Navigator:     [facility] team rounds completed, updates provided by facility. WBAT. f/u with ortho 8/23  Dispo:Lives alone, family in area. Family provides transportation  Full Code  Dc 8/30 [facility]  Started on Cipro last week for wound pocket infection, much improved. Low Risk            11       Total Score        3 Has Seen PCP in Last 6 Months (Yes=3, No=0)    8 Charlson Comorbidity Score (Age + Comorbid Conditions)        Criteria that do not apply:    . Living with Significant Other. Assisted Living. LTAC. SNF. or   Rehab    Patient Length of Stay (>5 days = 3)    IP Visits Last 12 Months (1-3=4, 4=9, >4=11)    Pt.  Coverage (Medicare=5 , Medicaid, or Self-Pay=4)      Active Ambulatory Problems     Diagnosis Date Noted    SENIA (stress urinary incontinence, female) 11/13/2012    Intrinsic sphincter deficiency (ISD) 11/13/2012    TIA (transient ischemic attack) 01/14/2014    HTN (hypertension) 01/14/2014    Coronary atherosclerosis of native coronary artery 01/14/2014    Chronic airway obstruction, not elsewhere classified 01/14/2014    Peripheral arterial disease ([facility] 01/14/2014    Diarrhea 04/13/2014    Dizziness 04/13/2014    Azotemia 04/13/2014    Aortic ectasia, abdominal ([address] 06/17/2016    Leg pain, bilateral 01/18/2017     Resolved Ambulatory Problems     Diagnosis Date Noted    Altered mental status: Resolved 01/14/2014    Degenerative joint disease (DJD) of hip 08/03/2018     Past Medical History:   Diagnosis Date    Arthritis     CAD (coronary artery disease)     Calculus of kidney     Chronic pain     COPD     Depression     Dry eyes     GERD (gastroesophageal reflux disease)     Glaucoma     Heart attack ([facility] 2005    Hypercholesterolemia     Hypertension     Other ill-defined conditions(799.89)     Other ill-defined conditions(799.89)     Other ill-defined conditions(799.89) 1941    Psychiatric disorder     [facility]     Thyroid disease

## 2018-12-12 ENCOUNTER — HOSPITAL ENCOUNTER (OUTPATIENT)
Dept: PREADMISSION TESTING | Age: 78
Discharge: HOME | End: 2018-12-12
Payer: MEDICARE

## 2018-12-12 VITALS — WEIGHT: 127 LBS | BODY MASS INDEX: 23.98 KG/M2 | HEIGHT: 61 IN

## 2018-12-12 LAB
ANION GAP SERPL CALC-SCNC: 7 MMOL/L (ref 3–18)
BUN SERPL-MCNC: 21 MG/DL (ref 7–18)
BUN/CREAT SERPL: 20
CALCIUM SERPL-MCNC: 9.2 MG/DL (ref 8.5–10.1)
CHLORIDE SERPL-SCNC: 105 MMOL/L (ref 100–108)
CO2 SERPL-SCNC: 25 MMOL/L (ref 21–32)
CREAT SERPL-MCNC: 1.03 MG/DL (ref 0.6–1.3)
GLUCOSE SERPL-MCNC: 84 MG/DL (ref 74–99)
HCT VFR BLD AUTO: 36.4 % (ref 35–45)
HGB BLD-MCNC: 11.9 G/DL (ref 12–16)
POTASSIUM SERPL-SCNC: 4.2 MMOL/L (ref 3.5–5.5)
SODIUM SERPL-SCNC: 137 MMOL/L (ref 136–145)

## 2018-12-12 PROCEDURE — 85018 HEMOGLOBIN: CPT

## 2018-12-12 PROCEDURE — 93005 ELECTROCARDIOGRAM TRACING: CPT

## 2018-12-12 PROCEDURE — 80048 BASIC METABOLIC PNL TOTAL CA: CPT

## 2018-12-12 NOTE — PERIOP NOTES
No sleep apnea or family history of malignant hyperthermia. Removable upper denture. Care fusion kit and instructions given and reviewed. PCP is aware of the surgery. No participation in clinical trial or research study. Meets criteria for special population- COPD. Posting notified. Instructed pt to get instructions on stopping Plavix and aspirin.

## 2018-12-13 LAB
ATRIAL RATE: 56 BPM
CALCULATED P AXIS, ECG09: 78 DEGREES
CALCULATED R AXIS, ECG10: -71 DEGREES
CALCULATED T AXIS, ECG11: 98 DEGREES
DIAGNOSIS, 93000: NORMAL
P-R INTERVAL, ECG05: 170 MS
Q-T INTERVAL, ECG07: 474 MS
QRS DURATION, ECG06: 92 MS
QTC CALCULATION (BEZET), ECG08: 457 MS
VENTRICULAR RATE, ECG03: 56 BPM

## 2019-01-13 PROBLEM — N39.41 URGE INCONTINENCE: Status: ACTIVE | Noted: 2019-01-13
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
## 2019-01-14 ENCOUNTER — ANESTHESIA EVENT (OUTPATIENT)
Dept: SURGERY | Age: 79
End: 2019-01-14
Payer: MEDICARE

## 2019-01-15 ENCOUNTER — ANESTHESIA (OUTPATIENT)
Dept: SURGERY | Age: 79
End: 2019-01-15
Payer: MEDICARE

## 2019-01-15 ENCOUNTER — HOSPITAL ENCOUNTER (OUTPATIENT)
Age: 79
Setting detail: OUTPATIENT SURGERY
Discharge: HOME | End: 2019-01-15
Attending: UROLOGY | Admitting: UROLOGY
Payer: MEDICARE

## 2019-01-15 VITALS
OXYGEN SATURATION: 99 % | WEIGHT: 128.44 LBS | HEIGHT: 61 IN | BODY MASS INDEX: 24.25 KG/M2 | DIASTOLIC BLOOD PRESSURE: 53 MMHG | RESPIRATION RATE: 18 BRPM | SYSTOLIC BLOOD PRESSURE: 166 MMHG | HEART RATE: 76 BPM | TEMPERATURE: 98.3 F

## 2019-01-15 PROCEDURE — 76210000021 HC REC RM PH II 0.5 TO 1 HR: Performed by: UROLOGY

## 2019-01-15 PROCEDURE — 74011250636 HC RX REV CODE- 250/636

## 2019-01-15 PROCEDURE — 74011250636 HC RX REV CODE- 250/636: Performed by: ANESTHESIOLOGY

## 2019-01-15 PROCEDURE — 77030018832 HC SOL IRR H20 ICUM -A: Performed by: UROLOGY

## 2019-01-15 PROCEDURE — 76060000031 HC ANESTHESIA FIRST 0.5 HR: Performed by: UROLOGY

## 2019-01-15 PROCEDURE — 77030020782 HC GWN BAIR PAWS FLX 3M -B: Performed by: UROLOGY

## 2019-01-15 PROCEDURE — 77030018836 HC SOL IRR NACL ICUM -A: Performed by: UROLOGY

## 2019-01-15 PROCEDURE — 74011000250 HC RX REV CODE- 250

## 2019-01-15 PROCEDURE — 76010000154 HC OR TIME FIRST 0.5 HR: Performed by: UROLOGY

## 2019-01-15 PROCEDURE — 74011250636 HC RX REV CODE- 250/636: Performed by: UROLOGY

## 2019-01-15 PROCEDURE — 74011000272 HC RX REV CODE- 272: Performed by: UROLOGY

## 2019-01-15 PROCEDURE — 74011000250 HC RX REV CODE- 250: Performed by: UROLOGY

## 2019-01-15 PROCEDURE — 77030032490 HC SLV COMPR SCD KNE COVD -B: Performed by: UROLOGY

## 2019-01-15 PROCEDURE — 76210000016 HC OR PH I REC 1 TO 1.5 HR: Performed by: UROLOGY

## 2019-01-15 RX ORDER — DIPHENHYDRAMINE HYDROCHLORIDE 50 MG/ML
INJECTION, SOLUTION INTRAMUSCULAR; INTRAVENOUS
Status: COMPLETED
Start: 2019-01-15 | End: 2019-01-15

## 2019-01-15 RX ORDER — CEFAZOLIN SODIUM 2 G/50ML
2 SOLUTION INTRAVENOUS ONCE
Status: COMPLETED | OUTPATIENT
Start: 2019-01-15 | End: 2019-01-15

## 2019-01-15 RX ORDER — ONDANSETRON 2 MG/ML
INJECTION INTRAMUSCULAR; INTRAVENOUS AS NEEDED
Status: DISCONTINUED | OUTPATIENT
Start: 2019-01-15 | End: 2019-01-15 | Stop reason: HOSPADM

## 2019-01-15 RX ORDER — FENTANYL CITRATE 50 UG/ML
50 INJECTION, SOLUTION INTRAMUSCULAR; INTRAVENOUS
Status: DISCONTINUED | OUTPATIENT
Start: 2019-01-15 | End: 2019-01-15 | Stop reason: HOSPADM

## 2019-01-15 RX ORDER — LIDOCAINE HYDROCHLORIDE 20 MG/ML
INJECTION, SOLUTION EPIDURAL; INFILTRATION; INTRACAUDAL; PERINEURAL AS NEEDED
Status: DISCONTINUED | OUTPATIENT
Start: 2019-01-15 | End: 2019-01-15 | Stop reason: HOSPADM

## 2019-01-15 RX ORDER — SODIUM CHLORIDE, SODIUM LACTATE, POTASSIUM CHLORIDE, CALCIUM CHLORIDE 600; 310; 30; 20 MG/100ML; MG/100ML; MG/100ML; MG/100ML
125 INJECTION, SOLUTION INTRAVENOUS CONTINUOUS
Status: DISCONTINUED | OUTPATIENT
Start: 2019-01-15 | End: 2019-01-15 | Stop reason: HOSPADM

## 2019-01-15 RX ORDER — KETAMINE HYDROCHLORIDE 10 MG/ML
INJECTION, SOLUTION INTRAMUSCULAR; INTRAVENOUS AS NEEDED
Status: DISCONTINUED | OUTPATIENT
Start: 2019-01-15 | End: 2019-01-15

## 2019-01-15 RX ORDER — MIDAZOLAM HYDROCHLORIDE 1 MG/ML
INJECTION, SOLUTION INTRAMUSCULAR; INTRAVENOUS AS NEEDED
Status: DISCONTINUED | OUTPATIENT
Start: 2019-01-15 | End: 2019-01-15 | Stop reason: HOSPADM

## 2019-01-15 RX ORDER — FLUMAZENIL 0.1 MG/ML
0.2 INJECTION INTRAVENOUS
Status: DISCONTINUED | OUTPATIENT
Start: 2019-01-15 | End: 2019-01-15 | Stop reason: HOSPADM

## 2019-01-15 RX ORDER — PROPOFOL 10 MG/ML
INJECTION, EMULSION INTRAVENOUS AS NEEDED
Status: DISCONTINUED | OUTPATIENT
Start: 2019-01-15 | End: 2019-01-15 | Stop reason: HOSPADM

## 2019-01-15 RX ORDER — SODIUM CHLORIDE 0.9 % (FLUSH) 0.9 %
5-40 SYRINGE (ML) INJECTION AS NEEDED
Status: DISCONTINUED | OUTPATIENT
Start: 2019-01-15 | End: 2019-01-15 | Stop reason: HOSPADM

## 2019-01-15 RX ORDER — HYDRALAZINE HYDROCHLORIDE 20 MG/ML
5 INJECTION INTRAMUSCULAR; INTRAVENOUS ONCE
Status: COMPLETED | OUTPATIENT
Start: 2019-01-15 | End: 2019-01-15

## 2019-01-15 RX ORDER — ACETAMINOPHEN/DIPHENHYDRAMINE 500MG-25MG
1 TABLET ORAL AS NEEDED
COMMUNITY

## 2019-01-15 RX ORDER — KETAMINE HYDROCHLORIDE 10 MG/ML
INJECTION, SOLUTION INTRAMUSCULAR; INTRAVENOUS AS NEEDED
Status: DISCONTINUED | OUTPATIENT
Start: 2019-01-15 | End: 2019-01-15 | Stop reason: HOSPADM

## 2019-01-15 RX ORDER — NALOXONE HYDROCHLORIDE 0.4 MG/ML
0.4 INJECTION, SOLUTION INTRAMUSCULAR; INTRAVENOUS; SUBCUTANEOUS AS NEEDED
Status: DISCONTINUED | OUTPATIENT
Start: 2019-01-15 | End: 2019-01-15 | Stop reason: HOSPADM

## 2019-01-15 RX ORDER — SODIUM CHLORIDE 0.9 % (FLUSH) 0.9 %
5-40 SYRINGE (ML) INJECTION EVERY 8 HOURS
Status: DISCONTINUED | OUTPATIENT
Start: 2019-01-15 | End: 2019-01-15 | Stop reason: HOSPADM

## 2019-01-15 RX ORDER — DIPHENHYDRAMINE HYDROCHLORIDE 50 MG/ML
12.5 INJECTION, SOLUTION INTRAMUSCULAR; INTRAVENOUS ONCE
Status: DISCONTINUED | OUTPATIENT
Start: 2019-01-15 | End: 2019-01-15 | Stop reason: HOSPADM

## 2019-01-15 RX ADMIN — PROPOFOL 30 MG: 10 INJECTION, EMULSION INTRAVENOUS at 10:45

## 2019-01-15 RX ADMIN — LIDOCAINE HYDROCHLORIDE 40 MG: 20 INJECTION, SOLUTION EPIDURAL; INFILTRATION; INTRACAUDAL; PERINEURAL at 10:46

## 2019-01-15 RX ADMIN — HYDRALAZINE HYDROCHLORIDE 5 MG: 20 INJECTION INTRAMUSCULAR; INTRAVENOUS at 12:22

## 2019-01-15 RX ADMIN — ONDANSETRON 4 MG: 2 INJECTION INTRAMUSCULAR; INTRAVENOUS at 10:45

## 2019-01-15 RX ADMIN — PROPOFOL 30 MG: 10 INJECTION, EMULSION INTRAVENOUS at 10:54

## 2019-01-15 RX ADMIN — KETAMINE HYDROCHLORIDE 15 MG: 10 INJECTION, SOLUTION INTRAMUSCULAR; INTRAVENOUS at 10:58

## 2019-01-15 RX ADMIN — SODIUM CHLORIDE 100 UNITS: 9 INJECTION INTRAMUSCULAR; INTRAVENOUS; SUBCUTANEOUS at 10:57

## 2019-01-15 RX ADMIN — PROPOFOL 20 MG: 10 INJECTION, EMULSION INTRAVENOUS at 10:48

## 2019-01-15 RX ADMIN — KETAMINE HYDROCHLORIDE 10 MG: 10 INJECTION, SOLUTION INTRAMUSCULAR; INTRAVENOUS at 10:56

## 2019-01-15 RX ADMIN — PROPOFOL 30 MG: 10 INJECTION, EMULSION INTRAVENOUS at 10:55

## 2019-01-15 RX ADMIN — DIPHENHYDRAMINE HYDROCHLORIDE 12.5 MG: 50 INJECTION, SOLUTION INTRAMUSCULAR; INTRAVENOUS at 11:49

## 2019-01-15 RX ADMIN — SODIUM CHLORIDE, SODIUM LACTATE, POTASSIUM CHLORIDE, AND CALCIUM CHLORIDE 125 ML/HR: 600; 310; 30; 20 INJECTION, SOLUTION INTRAVENOUS at 12:23

## 2019-01-15 RX ADMIN — PROPOFOL 30 MG: 10 INJECTION, EMULSION INTRAVENOUS at 10:46

## 2019-01-15 RX ADMIN — PROPOFOL 30 MG: 10 INJECTION, EMULSION INTRAVENOUS at 10:53

## 2019-01-15 RX ADMIN — PROPOFOL 20 MG: 10 INJECTION, EMULSION INTRAVENOUS at 10:51

## 2019-01-15 RX ADMIN — MIDAZOLAM HYDROCHLORIDE 2 MG: 1 INJECTION, SOLUTION INTRAMUSCULAR; INTRAVENOUS at 10:40

## 2019-01-15 RX ADMIN — PROPOFOL 30 MG: 10 INJECTION, EMULSION INTRAVENOUS at 10:50

## 2019-01-15 RX ADMIN — SODIUM CHLORIDE, SODIUM LACTATE, POTASSIUM CHLORIDE, AND CALCIUM CHLORIDE 125 ML/HR: 600; 310; 30; 20 INJECTION, SOLUTION INTRAVENOUS at 09:58

## 2019-01-15 RX ADMIN — CEFAZOLIN SODIUM 2 G: 2 SOLUTION INTRAVENOUS at 10:43

## 2019-01-15 NOTE — OP NOTES
PREOPERATIVE DIAGNOSIS: Urge incontinence      POSTOPERATIVE DIAGNOSIS: Urge incontinence      PROCEDURE: Cystoscopy with Botox injection of the bladder      ATTENDING SURGEON: Montrell Shah MD.     ASSISTANT: None      ANESTHESIA: MAC      ESTIMATED BLOOD LOSS: none      SPECIMENS REMOVED: none     COMPLICATIONS: None      DISPOSITION: The patient was taken to recovery in stable condition.      FINDINGS: The patient had a total of 100 units of Botox injected into the  bladder. It was divided into 30 mL -- 1 mL was injected at a time at 30  different injection sites.       HISTORY OF PRESENT ILLNESS: The patient is a very pleasant, 70year-old  female with urgency and frequency refractory to multiple meds who presents for Botox injection.              DESCRIPTION OF PROCEDURE: Preoperatively, risks and benefits of surgery  were described to the patient. The risks include, but are not limited to  bleeding, infection, injury to the bladder, urinary retention. The patient  assumed the risks and signed the informed consent. The patient was taken to  the operating room, placed on the OR table in supine position. She was  administered General anesthetic. She was administered intravenous  antibiotics. She was placed in dorsal lithotomy position and prepped and  draped in the usual sterile manner.      A 21-Welsh cystoscope and sheath was then inserted ransurethrally atraumatically under direct vision using a 30-degree lens. Panendoscopy was done. Bilateral ureteral orifices were in normal anatomic position. There were no stones, no tumors, no areas of concern within the bladder. The urethra was normal. Next 100 units of Botox were then injected. It was divided into 30 mL and each injection consisted of 1 mL placed at 30  different injection sites in preplanned areas throughout the bladder. It  was done by sectioning the bladder, moving from the floor up to the dome in  the midline and going up each lateral wall.  Care was taken not to inject  near the trigone at all. At the end of the procedure, there was minimal to  no bleeding. The patient had her bladder emptied.  She was revived from  anesthesia and taken to recovery in stable condition.  Martha Jeronimo MD

## 2019-01-15 NOTE — ANESTHESIA PREPROCEDURE EVALUATION
Anesthetic History Pertinent negatives: No PONV Review of Systems / Medical History Patient summary reviewed, nursing notes reviewed and pertinent labs reviewed Pulmonary COPD (COPD/emphysema): moderate Pertinent negatives: Smoker: former smoker. Neuro/Psych  
 
 
 
TIA (tia x 2 in the past, most recent > 10 years ago, no residual deficit) and psychiatric history (h/o depression) Cardiovascular Hypertension: well controlled CAD (s/p MI 2004, no recurrence) Pertinent negatives: No angina Exercise tolerance: >4 METS 
  
GI/Hepatic/Renal 
  
GERD: well controlled Endo/Other Hypothyroidism Arthritis Other Findings Physical Exam 
 
Airway Mallampati: II 
TM Distance: 4 - 6 cm Neck ROM: normal range of motion Mouth opening: Normal 
 
 Cardiovascular Rhythm: regular Rate: normal 
 
 
 
 Dental 
 
Dentition: Full upper dentures Pulmonary Breath sounds clear to auscultation Abdominal 
GI exam deferred Other Findings Anesthetic Plan ASA: 3 Anesthesia type: MAC Anesthetic plan and risks discussed with: Patient Plan MAC with possible brief GA if needed. Pt understands risks and agrees to proceed. Moderate risk per cardiology. Pt self stopped beta blocker 3 days ago. Will give dose perioperatively if HR and BP can tolerate.

## 2019-01-15 NOTE — PERIOP NOTES
TRANSFER - IN REPORT: 
 
Verbal report received from [id number] [facility], [address] [place] [address] nurse (name) on Nabeel Hawthorne  being received from OR (unit) for routine progression of care Report consisted of patients Situation, Background, Assessment and  
Recommendations(SBAR). Information from the following report(s) OR Summary and Procedure Summary was reviewed with the receiving nurse. Opportunity for questions and clarification was provided. Assessment completed upon patients arrival to unit and care assumed.

## 2019-01-15 NOTE — PERIOP NOTES
Reviewed PTA medication list with patient/caregiver and patient/caregiver denies any additional medications. Patient admits to having a responsible adult care for them for at least 24 hours after surgery. 
  
Dual skin assessment completed by Marylin Vazquez RN and Makenna Montenegro RN.

## 2019-01-15 NOTE — DISCHARGE INSTRUCTIONS
Resume pre hospital diet  Drink plenty of fluids  Ambulate multiple times daily  Shower tomorrow  Dr. Sayda Molina office will call with follow up appointment    DISCHARGE SUMMARY from Nurse    PATIENT INSTRUCTIONS:    After general anesthesia or intravenous sedation, for 24 hours or while taking prescription Narcotics:  · Limit your activities  · Do not drive and operate hazardous machinery  · Do not make important personal or business decisions  · Do  not drink alcoholic beverages  · If you have not urinated within 8 hours after discharge, please contact your surgeon on call. Report the following to your surgeon:  · Excessive pain, swelling, redness or odor of or around the surgical area  · Temperature over 100.5  · Nausea and vomiting lasting longer than 4 hours or if unable to take medications  · Any signs of decreased circulation or nerve impairment to extremity: change in color, persistent  numbness, tingling, coldness or increase pain  · Any questions    What to do at Home:  [address] A POST OP CHECK UP    If you experience any of the following symptoms heavy bleeding, unable to urinate, fevers, severe pain, please follow up with     *  Please give a list of your current medications to your Primary Care Provider. *  Please update this list whenever your medications are discontinued, doses are      changed, or new medications (including over-the-counter products) are added. *  Please carry medication information at all times in case of emergency situations. These are general instructions for a healthy lifestyle:    No smoking/ No tobacco products/ Avoid exposure to second hand smoke  Surgeon General's Warning:  Quitting smoking now greatly reduces serious risk to your health.     Obesity, smoking, and sedentary lifestyle greatly increases your risk for illness    A healthy diet, regular physical exercise & weight monitoring are important for maintaining a healthy lifestyle    You may be retaining fluid if you have a history of heart failure or if you experience any of the following symptoms:  Weight gain of 3 pounds or more overnight or 5 pounds in a week, increased swelling in our hands or feet or shortness of breath while lying flat in bed. Please call your doctor as soon as you notice any of these symptoms; do not wait until your next office visit. Recognize signs and symptoms of STROKE:    F-face looks uneven    A-arms unable to move or move unevenly    S-speech slurred or non-existent    T-time-call 911 as soon as signs and symptoms begin-DO NOT go       Back to bed or wait to see if you get better-TIME IS BRAIN. Warning Signs of HEART ATTACK     Call 911 if you have these symptoms:   Chest discomfort. Most heart attacks involve discomfort in the center of the chest that lasts more than a few minutes, or that goes away and comes back. It can feel like uncomfortable pressure, squeezing, fullness, or pain.  Discomfort in other areas of the upper body. Symptoms can include pain or discomfort in one or both arms, the back, neck, jaw, or stomach.  Shortness of breath with or without chest discomfort.  Other signs may include breaking out in a cold sweat, nausea, or lightheadedness. Don't wait more than five minutes to call 911 - MINUTES MATTER! Fast action can save your life. Calling 911 is almost always the fastest way to get lifesaving treatment. Emergency Medical Services staff can begin treatment when they arrive -- up to an hour sooner than if someone gets to the hospital by car. Patient armband removed and shredded    The discharge information has been reviewed with the patient and caregiver. The patient and caregiver verbalized understanding.   Discharge medications reviewed with the patient and caregiver and appropriate educational materials and side effects teaching were provided.   ___________________________________________________________________________________________________________________________________    Patient armband removed and shredded

## 2019-01-15 NOTE — INTERVAL H&P NOTE
H&P Update: 
Rosa Maria Choudhury was seen and examined. History and physical has been reviewed. The patient has been examined.  There have been no significant clinical changes since the completion of the originally dated History and Physical. 
 
Signed By: Jad Mar MD   
 January 15, 2019 10:38 AM

## 2019-01-15 NOTE — PERIOP NOTES
[id number] Monae Rosas for patient to be discharge home with current BP numbers, patient will need to resume BP meds at home.

## 2020-06-08 NOTE — ANESTHESIA POSTPROCEDURE EVALUATION
Post-Anesthesia Evaluation and Assessment Cardiovascular Function/Vital Signs Visit Vitals /64 Pulse 67 Temp 37.3 °C (99.2 °F) Resp 12 Ht 5' 0.5\" (1.537 m) Wt 58.3 kg (128 lb 7 oz) SpO2 99% BMI 24.67 kg/m² Patient is status post Procedure(s): 
CYSTOSCOPY, BOTOX INJECTION TO BLADDER (100 UNITS), \"SPEC POP\". Nausea/Vomiting: Controlled. Postoperative hydration reviewed and adequate. Pain: 
Pain Scale 1: Visual (01/15/19 1108) Pain Intensity 1: 0 (01/15/19 1108) Managed. Neurological Status:  
Neuro (WDL): Exceptions to WDL (01/15/19 1108) At baseline. Mental Status and Level of Consciousness: Arousable. Pulmonary Status:  
O2 Device: Nasal cannula (01/15/19 1108) Adequate oxygenation and airway patent. Complications related to anesthesia: None Post-anesthesia assessment completed. No concerns. Patient has met all discharge requirements. Signed By: Bryan Brown MD  
 January 15, 2019 Pt here annual exam referral for leiomyoma4 of Uterus  Pt states no complaints  Good#[phone]  Pharmacy verified

## 2022-05-23 NOTE — MR AVS SNAPSHOT
To SS  Ok to place order to ortho?   Visit Information Date & Time Provider Department Dept. Phone Encounter #  
 2/21/2017 10:00 AM Tiffanie Kang MD [facility] [address]  [id number] Your Appointments 3/21/2017 10:15 AM  
Follow Up with Tiffanie Kang MD  
[facility] ([address]) Appt Note: follow up with [place] [id number] Upcoming Health Maintenance Date Due DTaP/Tdap/Td series (1 - Tdap) 10/29/1963 FOBT Q 1 YEAR AGE 50-75 10/29/1992 ZOSTER VACCINE AGE 60> 10/29/2002 GLAUCOMA SCREENING Q2Y 10/29/2007 MEDICARE YEARLY EXAM 10/29/2007 Pneumococcal 65+ Low/Medium Risk (2 of 2 - PCV13) 4/16/2015 INFLUENZA AGE 9 TO ADULT 8/1/2016 Allergies as of 2/21/2017  Review Complete On: 2/21/2017 By: Amber Ferrara RN Severity Noted Reaction Type Reactions Oxybutynin Chloride Medium 05/13/2016   Systemic Swelling, Contact Dermatitis Minocycline  09/10/2012    Itching Nsaids (Non-steroidal Anti-inflammatory Drug)  09/10/2012    Other (comments) Mess stomach up Oragrafin  09/10/2012    Rash, Swelling Sulfa (Sulfonamide Antibiotics)  09/10/2012    Hives, Swelling Current Immunizations  Reviewed on 4/17/2014 Name Date Influenza Vaccine 10/1/2013 Pneumococcal Polysaccharide (PPSV-23) 4/16/2014  6:45 AM,  Deferred (Patient Refused) Not reviewed this visit Vitals BP Pulse Resp Height(growth percentile) Weight(growth percentile) BMI  
 122/68 76 20 5' 1\" (1.549 m) 128 lb (58.1 kg) 24.19 kg/m2 OB Status Smoking Status Hysterectomy Former Smoker Vitals History BMI and BSA Data Body Mass Index Body Surface Area  
 24.19 kg/m 2 1.58 m 2 Preferred Pharmacy Pharmacy Name Phone [id number] [address] [place] [address]. [phone] Your Updated Medication List  
  
   
This list is accurate as of: 2/21/17 11:51 AM.  Always use your most recent med list.  
  
  
  
  
 albuterol 90 mcg/actuation inhaler Commonly known as:  PROVENTIL HFA, VENTOLIN HFA, PROAIR HFA Take 1 Puff by inhalation every four (4) hours as needed. Indications: CHRONIC OBSTRUCTIVE PULMONARY DISEASE ALPHAGAN P 0.1 % ophthalmic solution Generic drug:  brimonidine Administer  to both eyes two (2) times a day. Indications: glaucoma  
  
 aspirin 81 mg chewable tablet Take 1 Tab by mouth daily. BROVANA 15 mcg/2 mL Nebu neb solution Generic drug:  arformoterol 15 mcg by Nebulization route two (2) times a day. Pt instructed to take am of surgery. Indications: COPD ASSOCIATED WITH CHRONIC BRONCHITIS buPROPion 100 mg tablet Commonly known as:  [facility] Take 200 mg by mouth two (2) times a day. Indications: MAJOR DEPRESSIVE DISORDER  
  
 calcium 500 mg Tab Take 2 Tabs by mouth daily. cholecalciferol (vitamin D3) 2,000 unit Tab Commonly known as:  VITAMIN D3 Take 5,000 mg by mouth daily. ferrous sulfate 325 mg (65 mg iron) tablet Take  by mouth Daily (before breakfast). Only takes 4 times per week. FML LIQUIFILM 0.1 % ophthalmic suspension Generic drug:  fluorometholone Administer 1 Drop to both eyes two (2) times a day. Indications: Dry eyes HYDROcodone-acetaminophen 5-325 mg per tablet Commonly known as:  Boss Minor Take 1 Tab by mouth every four (4) hours as needed for Pain. Max Daily Amount: 6 Tabs. levothyroxine 88 mcg tablet Commonly known as:  SYNTHROID Take 88 mcg by mouth Daily (before breakfast). Pt instructed to take am of surgery. Indications: HYPOTHYROIDISM  
  
 magnesium chloride 64 mg tablet Commonly known as:  MAG DELAY Take 1 Tab by mouth daily. melatonin Tab tablet Take 10 mg by mouth nightly. NEURONTIN 300 mg capsule Generic drug:  gabapentin Take 300 mg by mouth nightly. nicotinic acid 500 mg tablet Commonly known as:  NIACIN Take 1 Tab by mouth Daily (before breakfast). Omeprazole delayed release 20 mg tablet Commonly known as:  PRILOSEC D/R Take 20 mg by mouth daily. OTHER  
HOME OXYGEN  
  
 PLAVIX 75 mg Tab Generic drug:  clopidogrel Take 75 mg by mouth daily. Indications: Cardiac stent  
  
 pravastatin 40 mg tablet Commonly known as:  PRAVACHOL Take 80 mg by mouth nightly. Indications: HYPERCHOLESTEROLEMIA  
  
 ramipril 10 mg capsule Commonly known as:  ALTACE Take 10 mg by mouth daily. Pt instructed to take am of surgery. Indications: HYPERTENSION  
  
 RESTASIS 0.05 % ophthalmic emulsion Generic drug:  cycloSPORINE Administer 1 Drop to both eyes two (2) times a day. Indications: DRY EYE  
  
 VALIUM 10 mg tablet Generic drug:  diazePAM  
Take 10 mg by mouth every eight (8) hours as needed. Indications: ANXIETY Introducing [facility]! Romayne Duster introduces [facility] patient portal. Now you can access parts of your medical record, email your doctor's office, and request medication refills online. 1. In your internet browser, go to [email or web address] 2. Click on the First Time User? Click Here link in the Sign In box. You will see the New Member Sign Up page. 3. Enter your [facility] Access Code exactly as it appears below. You will not need to use this code after youve completed the sign-up process. If you do not sign up before the expiration date, you must request a new code. · [facility] Access Code: [email or web address] Expires: 3/9/2017  4:07 PM 
 
4. Enter the last four digits of your Social Security Number (xxxx) and Date of Birth (mm/dd/yyyy) as indicated and click Submit. You will be taken to the next sign-up page. 5. Create a [facility] ID.  This will be your [facility] login ID and cannot be changed, so think of one that is secure and easy to remember. 6. Create a [employer] password. You can change your password at any time. 7. Enter your Password Reset Question and Answer. This can be used at a later time if you forget your password. 8. Enter your e-mail address. You will receive e-mail notification when new information is available in [address] 9. Click Sign Up. You can now view and download portions of your medical record. 10. Click the Download Summary menu link to download a portable copy of your medical information. If you have questions, please visit the Frequently Asked Questions section of the [employer] website. Remember, [employer] is NOT to be used for urgent needs. For medical emergencies, dial 911. Now available from your iPhone and Android! Please provide this summary of care documentation to your next provider. Your primary care clinician is listed as Gadiel Andrade. If you have any questions after today's visit, please call [phone].

## (undated) DEVICE — KENDALL SCD EXPRESS SLEEVES, KNEE LENGTH, MEDIUM: Brand: KENDALL SCD

## (undated) DEVICE — MEDI-VAC NON-CONDUCTIVE SUCTION TUBING: Brand: CARDINAL HEALTH

## (undated) DEVICE — BIPOLAR SEALER 23-301-1 AQM MBS: Brand: AQUAMANTYS™

## (undated) DEVICE — HANDPIECE SET WITH FAN SPRAY TIP: Brand: INTERPULSE

## (undated) DEVICE — WOUND RETRACTOR AND PROTECTOR: Brand: ALEXIS O WOUND PROTECTOR-RETRACTOR

## (undated) DEVICE — SINGLE PORT MANIFOLD: Brand: NEPTUNE 2

## (undated) DEVICE — SKIN MARKER,REGULAR TIP WITH RULER AND LABELS: Brand: DEVON

## (undated) DEVICE — NEEDLE HYPO 21GA L1.5IN INTRAMUSCULAR S STL LATCH BVL UP

## (undated) DEVICE — Z DISCONTINUED USE (MFG CAT 7984-37) SOLUTION IV SODIUM CHL 0.9% 100 ML INJ

## (undated) DEVICE — SUTURE VCRL 0 L27IN ABSRB OS-6 BRAID COAT UD J534H

## (undated) DEVICE — DRESSING FOAM SELF ADH 20X10 CM ABSORBENT MEPILEX BORDER

## (undated) DEVICE — DEVON™ KNEE AND BODY STRAP 60" X 3" (1.5 M X 7.6 CM): Brand: DEVON

## (undated) DEVICE — STERILE LATEX POWDER-FREE SURGICAL GLOVESWITH NITRILE COATING: Brand: PROTEXIS

## (undated) DEVICE — SOLUTION LACTATED RINGERS INJECTION USP

## (undated) DEVICE — PACK PROCEDURE SURG ANTR HIP

## (undated) DEVICE — SOLUTION IRRIGATION H2O 0797305] ICU MEDICAL INC]

## (undated) DEVICE — SOLUTION IRRIG 3000ML 0.9% SOD CHL FLX CONT 0797208] ICU MEDICAL INC]

## (undated) DEVICE — ADHESIVE SKIN CLOSURE 4X22 CM PREMIERPRO EXOFINFUSION DISP

## (undated) DEVICE — (D)PREP SKN CHLRAPRP APPL 26ML -- CONVERT TO ITEM 371833

## (undated) DEVICE — STERILE POLYISOPRENE POWDER-FREE SURGICAL GLOVES: Brand: PROTEXIS

## (undated) DEVICE — SOL IRRIGATION INJ NACL 0.9% 500ML BTL

## (undated) DEVICE — Device

## (undated) DEVICE — SYRINGE MED 20ML STD CLR PLAS LUERLOCK TIP N CTRL DISP

## (undated) DEVICE — CYSTO PACK: Brand: MEDLINE INDUSTRIES, INC.

## (undated) DEVICE — BLADE SAW 1.27X13X90 MM FOR LG BNE

## (undated) DEVICE — REM POLYHESIVE ADULT PATIENT RETURN ELECTRODE: Brand: VALLEYLAB

## (undated) DEVICE — BAG URIN LEG DISPOZ-A-BG 19OZ -- W/18IN EXT TUBING

## (undated) DEVICE — SUTURE VCRL 2-0 L27IN ABSRB UD OS-6 L36MM 1/2 CIR REV CUT J533H

## (undated) DEVICE — STERILE POLYISOPRENE POWDER-FREE SURGICAL GLOVES WITH EMOLLIENT COATING: Brand: PROTEXIS

## (undated) DEVICE — SOL IRR STRL H2O 1500ML BTL --

## (undated) DEVICE — CATHETERIZATION TRAY 16 FR FOL DRAINAGE BG LUBRI-SIL IC